# Patient Record
Sex: MALE | Race: WHITE | URBAN - METROPOLITAN AREA
[De-identification: names, ages, dates, MRNs, and addresses within clinical notes are randomized per-mention and may not be internally consistent; named-entity substitution may affect disease eponyms.]

---

## 2017-12-22 ENCOUNTER — OFFICE VISIT (OUTPATIENT)
Dept: URGENT CARE | Facility: CLINIC | Age: 52
End: 2017-12-22
Payer: COMMERCIAL

## 2017-12-22 PROCEDURE — G0382 LEV 3 HOSP TYPE B ED VISIT: HCPCS

## 2017-12-22 PROCEDURE — 99283 EMERGENCY DEPT VISIT LOW MDM: CPT

## 2017-12-23 NOTE — PROGRESS NOTES
Assessment   1  Acute tonsillitis (463) (J03 90)    Plan   Acute tonsillitis    · Amoxicillin 500 MG Oral Capsule; TAKE 1 CAPSULE 3 TIMES DAILY UNTIL GONE    Chief Complaint   1  Sore Throat  Chief Complaint Free Text Note Form: Pt c/o sudden onset sore throat and diffiuclty swallowing since last night      History of Present Illness   HPI: This patient developed a sore throat last evening  No aches or chills by history  No cough or coryza  Patient is alert oriented pleasant and in no distress  He does not appear toxic  Pupils equal react to light sclerae white conjunctiva pink  Nose is clear  Throat shows symmetrically enlarged tonsils which are inflamed  No exudate  No peritonsillar abscess  TMs are normal  Neck is supple with tender anterior cervical nodes  Lungs are clear bilaterally with good breath sounds  No wheezing rales or rhonchi  Heart is regular  Good skin color and turgor  No skin rash  Hospital Based Practices Required Assessment:      Pain Assessment      the patient states they have pain  (on a scale of 0 to 10, the patient rates the pain at 9 )      Abuse And Domestic Violence Screen       Yes, the patient is safe at home  Depression And Suicide Screen  No, the patient has not had thoughts of hurting themself  No, the patient has not felt depressed in the past 7 days  Prefered Language is  [place]  Primary Language is  English  Active Problems   1  Acute laryngotracheitis (464 20) (J04 2)   2  Mild intermittent reactive airway disease with acute exacerbation (493 92) (J45 21)    Past Medical History   1  History of Abscess (682 9) (L02 91)   2  Acute viral pharyngitis (462) (J02 8,B97 89)   3  History of Cough (786 2) (R05)   4  History of squamous cell carcinoma of skin (V10 83) (Z85 828)   5  History of Keratotic lesion (701 1) (L57 0)   6  History of Localized adiposity (278 1) (E65)   7  History of Prepatellar bursitis, unspecified laterality (726 65) (M70 40)   8  History of Right patellofemoral syndrome (719 46) (M22 2X1)   9  History of Skin growth (239 2) (D49 2)   10  History of Synovial cyst of popliteal space (727 51) (M71 20)  Active Problems And Past Medical History Reviewed: The active problems and past medical history were reviewed and updated today  Family History   Mother    1  Family history unknown (V49 89) (Z78 9)  Father    2  Family history unknown (V49 89) (Z78 9)  Family History Reviewed: The family history was reviewed and updated today  Social History    · Marital History - Currently    · Never A Smoker   · Working Full Time  Social History Reviewed: The social history was reviewed and updated today  Surgical History   1  History of Appendectomy  Surgical History Reviewed: The surgical history was reviewed and updated today  Current Meds    1  No Reported Medications Recorded  Medication List Reviewed: The medication list was reviewed and updated today  Allergies   1   No Known Drug Allergies    Vitals   Signs   Recorded: 48Gcc8134 04:25PM   Temperature: 100 F  Heart Rate: 80  Respiration: 16  Systolic: 487  Diastolic: 80  Height: 5 ft 11 in  Weight: 215 lb   BMI Calculated: 29 99  BSA Calculated: 2 17  O2 Saturation: 99  Pain Scale: 9    Signatures    Electronically signed by : SHARONDA Crockett ; Dec 22 2017  4:34PM EST                       (Author)

## 2018-01-04 ENCOUNTER — OFFICE VISIT (OUTPATIENT)
Dept: URGENT CARE | Facility: CLINIC | Age: 53
End: 2018-01-04
Payer: COMMERCIAL

## 2018-01-04 LAB — S PYO AG THROAT QL: NEGATIVE

## 2018-01-04 PROCEDURE — 99283 EMERGENCY DEPT VISIT LOW MDM: CPT

## 2018-01-04 PROCEDURE — 87430 STREP A AG IA: CPT

## 2018-01-04 PROCEDURE — G0382 LEV 3 HOSP TYPE B ED VISIT: HCPCS

## 2018-01-05 NOTE — PROGRESS NOTES
Assessment   1  Postnasal drip (963 39) (R09 82)    Plan   Acute tonsillitis    · Rapid StrepA- POC; Source:Throat; Status:Resulted - Requires Verification,Retrospective    By Protocol Authorization;   Done: 05RJE4547 03:10PM  Postnasal drip    · Fluticasone Propionate 50 MCG/ACT Nasal Suspension; 1 spray in ea nostril bid    Discussion/Summary   Discussion Summary:    The rapid strep was negative  I suspect that your symptoms are due to postnasal drip irritating the throat  Increase fluids, and use the nasal spray as written  Zyrtec for the congestion  Avoid decongestants due to elevated blood pressure  Follow up with FMD regarding the elevated BP  Chief Complaint   1  Cold Symptoms  Chief Complaint Free Text Note Form: Pt was seen here on 12/22 and given amoxicillin for tonsillitis  He completed the abx  Last inght he developed a scratchy throat, cough and right ear pain  History of Present Illness   HPI: He was treated with Amoxicillin 2 wks ago  States that he got better  Now c/o scratchy throat and inc congestion mostly in the Rt ear  is elevated  He believes it is white coat  Hospital Based Practices Required Assessment:      Pain Assessment      the patient states they do not have pain  Abuse And Domestic Violence Screen       Yes, the patient is safe at home  -- The patient states no one is hurting them  Depression And Suicide Screen  No, the patient has not had thoughts of hurting themself  No, the patient has not felt depressed in the past 7 days  Prefered Language is  [place]  Primary Language is  English  Review of Systems   Focused-Male:      Constitutional: no fever or chills, feels well, no tiredness, no recent weight loss or weight gain  ENT: as noted in HPI  Respiratory: no complaints of shortness of breath, no wheezing or cough, no dyspnea on exertion, no orthopnea or PND        Musculoskeletal: no complaints of arthralgia, no myalgia, no joint swelling or stiffness, no limb pain or swelling  Integumentary: no complaints of skin rash or lesion, no itching or dry skin, no skin wounds  Active Problems   1  Acute laryngotracheitis (464 20) (J04 2)   2  Acute tonsillitis (463) (J03 90)   3  Mild intermittent reactive airway disease with acute exacerbation (493 92) (J45 21)    Past Medical History   1  History of Abscess (682 9) (L02 91)   2  Acute viral pharyngitis (462) (J02 8,B97 89)   3  History of Cough (786 2) (R05)   4  History of squamous cell carcinoma of skin (V10 83) (Z85 828)   5  History of Keratotic lesion (701 1) (L57 0)   6  History of Localized adiposity (278 1) (E65)   7  History of Prepatellar bursitis, unspecified laterality (726 65) (M70 40)   8  History of Right patellofemoral syndrome (719 46) (M22 2X1)   9  History of Skin growth (239 2) (D49 2)   10  History of Synovial cyst of popliteal space (727 51) (M71 20)    Family History   Mother    1  Family history unknown (V49 89) (Z78 9)  Father    2  Family history unknown (V49 89) (Z78 9)    Social History    · Marital History - Currently    · Never A Smoker   · Working Full Time    Surgical History   1  History of Appendectomy    Current Meds    1  No Reported Medications Recorded    Allergies   1  No Known Drug Allergies    Vitals   Signs   Recorded: 68WJJ2846 02:48PM   Temperature: 98 2 F  Heart Rate: 114  Respiration: 16  Systolic: 100  Diastolic: 441  O2 Saturation: 95    Physical Exam        Constitutional      General appearance: No acute distress, well appearing and well nourished  Eyes      Conjunctiva and lids: No swelling, erythema, or discharge  Ears, Nose, Mouth, and Throat      External inspection of ears and nose: Normal        Otoscopic examination: Abnormal  -- There is inc congestion behind the Rt TM  -- Pharynx is ess OK  Pulmonary      Respiratory effort: No increased work of breathing or signs of respiratory distress  Auscultation of lungs: Clear to auscultation  Cardiovascular      Auscultation of heart: Normal rate and rhythm, normal S1 and S2, without murmurs  Musculoskeletal      Gait and station: Normal        Digits and nails: Normal without clubbing or cyanosis  Inspection/palpation of joints, bones, and muscles: Normal        Skin      Skin and subcutaneous tissue: Abnormal  -- Inc tattoos        Results/Data   Rapid Marsa Drown- POC 22SLE0249 03:10PM Miguel Angel Anne      Test Name Result Flag Reference   Rapid Strep Negative                      Signatures    Electronically signed by : Oneil Chávez MD; Jan 4 2018  3:31PM EST                       (Author)

## 2018-01-08 ENCOUNTER — OFFICE VISIT (OUTPATIENT)
Dept: URGENT CARE | Facility: CLINIC | Age: 53
End: 2018-01-08
Payer: COMMERCIAL

## 2018-01-08 PROCEDURE — G0382 LEV 3 HOSP TYPE B ED VISIT: HCPCS

## 2018-01-08 PROCEDURE — 99283 EMERGENCY DEPT VISIT LOW MDM: CPT

## 2018-01-09 NOTE — PROGRESS NOTES
Assessment   1  Acute pharyngitis (462) (J02 9)    Plan   Acute pharyngitis    · Azithromycin 250 MG Oral Tablet; TAKE 2 TABLETS ON DAY 1 THEN TAKE 1    TABLET A DAY FOR 4 DAYS   · PredniSONE 20 MG Oral Tablet; take one tab daily for 3 days    Chief Complaint   1  Sore Throat  Chief Complaint Free Text Note Form: The patient has been here 12/22 and 1/4 for a sore throat  He reports after the first visit he improved for four days and then developed a sore throat again  He denies any fevers  History of Present Illness   HPI: December 22nd this patient was treated for tonsillitis with amoxicillin to resolution  Few days ago he began to have a sore throat again  He described nasal congestion but this seems to have subsided  No coughing  No fever or chills  His chief complaint is pain when swallowing  is alert oriented pleasant and in no distress  He does not appear toxic  Pupils equal react to light sclerae white conjunctiva pink  Nose is mildly congested  Throat shows moderately enlarged tonsils with some inflammation  No exudate  Neck is supple with anterior cervical nodes  TMs are normal      apparently was seen here 4 days ago and had a negative strep screen  He specifically requested prednisone for his symptoms  Hospital Based Practices Required Assessment:      Pain Assessment      the patient states they have pain  The pain is located in the sore throat  (on a scale of 0 to 10, the patient rates the pain at 7 )      Abuse And Domestic Violence Screen       Yes, the patient is safe at home  -- The patient states no one is hurting them  Depression And Suicide Screen  No, the patient has not had thoughts of hurting themself  No, the patient has not felt depressed in the past 7 days  Active Problems   1  Acute laryngotracheitis (464 20) (J04 2)   2  Acute tonsillitis (463) (J03 90)   3  Mild intermittent reactive airway disease with acute exacerbation (493 92) (J45 21)   4   Postnasal drip (784 91) (R09 82)    Past Medical History   1  History of Abscess (682 9) (L02 91)   2  Acute viral pharyngitis (462) (J02 8,B97 89)   3  History of Cough (786 2) (R05)   4  History of squamous cell carcinoma of skin (V10 83) (Z85 828)   5  History of Keratotic lesion (701 1) (L57 0)   6  History of Localized adiposity (278 1) (E65)   7  History of Prepatellar bursitis, unspecified laterality (726 65) (M70 40)   8  History of Right patellofemoral syndrome (719 46) (M22 2X1)   9  History of Skin growth (239 2) (D49 2)   10  History of Synovial cyst of popliteal space (727 51) (M71 20)  Active Problems And Past Medical History Reviewed: The active problems and past medical history were reviewed and updated today  Family History   Mother    1  Family history unknown (V49 89) (Z78 9)  Father    2  Family history unknown (V49 89) (Z78 9)  Family History Reviewed: The family history was reviewed and updated today  Social History    · Marital History - Currently    · Never A Smoker   · Working Full Time  Social History Reviewed: The social history was reviewed and updated today  Surgical History   1  History of Appendectomy  Surgical History Reviewed: The surgical history was reviewed and updated today  Current Meds    1  Fluticasone Propionate 50 MCG/ACT Nasal Suspension; 1 spray in ea nostril bid; Therapy: 35FAY6024 to (Last FX:56WKR3439)  Requested for: 90UVP9486 Ordered  Medication List Reviewed: The medication list was reviewed and updated today  Allergies   1   No Known Drug Allergies    Vitals   Signs   Recorded: 15RKB0847 04:21PM   Temperature: 99 2 F  Heart Rate: 118  Respiration: 16  Systolic: 247  Diastolic: 98  Height: 5 ft 11 in  Weight: 215 lb   BMI Calculated: 29 99  BSA Calculated: 2 17  O2 Saturation: 96  Pain Scale: 7    Signatures    Electronically signed by : SHARONDA Doe ; Jan 8 2018  4:35PM EST                       (Author)

## 2018-01-13 NOTE — PROGRESS NOTES
Assessment    1  Encounter for preventive health examination (V70 0) (Z00 00)    Plan  Encounter for screening colonoscopy    · COLONOSCOPY; Status:Active; Requested RVB:16TJQ1905;    · 2 - Shania Gardner MD, Quique Stoner  (Gastroenterology) Physician Referral  Consult  Status: Active   Requested for: 63YWK1949  Care Summary provided  : Yes  Health Maintenance    · Call [phone] if: You have any warning signs of skin cancer ; Status:Complete;    Done: 86JCV3369   · Call 911 if: You experience a new kind of chest pain (angina) or pressure ;  Status:Complete;   Done: 86JIW7968   · Always use a seat belt and shoulder strap when riding or driving a motor vehicle ;  Status:Complete;   Done: 40VKB1939   · Begin a limited exercise program ; Status:Complete;   Done: 82PGO4797   · Brush your teeth freq1 and floss at least once a day ; Status:Complete;   Done:  60IIE0071   · Keep a diary of when and what you eat ; Status:Complete;   Done: 22FJA4212   · Regular aerobic exercise can help reduce stress ; Status:Complete;   Done: 11EJM2475   · Some eating tips that can help you lose weight ; Status:Complete;   Done: 93KOV3732   · Use a sun block product with an SPF of 15 or more ; Status:Complete;   Done:  99GIP0011   · We encourage all of our patients to exercise regularly    30 minutes of exercise or physical  activity five or more days a week is recommended for children and adults ;  Status:Complete;   Done: 88NYE8386   · We recommend routine visits to a dentist ; Status:Complete;   Done: 82YIS0969   · We recommend that you bring your body mass index down to 26 ; Status:Complete;    Done: 72TUB7717   · We recommend that you follow the "Mediterranean diet "; Status:Complete;   Done:  02JJW3184   · We recommend that you follow these rules for gun safety ; Status:Complete;   Done:  82SUI9511   · Follow-up visit in 1 year Evaluation and Treatment  Follow-up  Status: Complete  Done:  16ARS4443  Need for diphtheria-tetanus-pertussis (Tdap) vaccine    · Adacel 5-2-15 5 LF-MCG/0 5 Intramuscular Suspension  PMH: Flu vaccine need    · Stop: Fluzone Intramuscular Injectable    Discussion/Summary  Impression: health maintenance visit  Currently, he eats a poor diet and has an inadequate exercise regimen  Prostate cancer screening: the risks and benefits of prostate cancer screening were discussed and prostate cancer screening is current  Testicular cancer screening: the risks and benefits of testicular cancer screening were discussed and self testicular exam technique was taught  Colorectal cancer screening: the risks and benefits of colorectal cancer screening were discussed and colonoscopy has been ordered  Screening lab work includes glucose, lipid profile and urinalysis  The risks and benefits of immunizations were discussed, immunizations are needed and shot declined will ggive Adacel today  He was advised to be evaluated by Gastroenterologist for colonoscopy  Advice and education were given regarding nutrition, aerobic exercise, weight bearing exercise and weight loss  Patient discussion: discussed with the patient  50-year-old man here today for comprehensive physical examination  Aside from being somewhat overweight his examination is normal  Issues at hand are elevated cholesterol levels he needs to modify his diet and lose some weight  He does not have an aerobic exercise routine and I have recommended this to him  Will give him paperwork for colonoscopy a referral  I'm giving him Adacel today  Will reappoint in one year  Chief Complaint  Complete Physical 46 y/o  Does not want the flu shot  Has not had a colonoscopy done yet  Review of Systems    Constitutional: No fever or chills, feels well, no tiredness, no recent weight gain or weight loss  Eyes: No complaints of eye pain, no red eyes, no discharge from eyes, no itchy eyes     ENT: no complaints of earache, no hearing loss, no nosebleeds, no nasal discharge, no sore throat, no hoarseness  Cardiovascular: No complaints of slow heart rate, no fast heart rate, no chest pain, no palpitations, no leg claudication, no lower extremity  Respiratory: No complaints of shortness of breath, no wheezing, no cough, no SOB on exertion, no orthopnea or PND  Gastrointestinal: No complaints of abdominal pain, no constipation, no nausea or vomiting, no diarrhea or bloody stools  Genitourinary: No complaints of dysuria, no incontinence, no hesitancy, no nocturia, no genital lesion, no testicular pain  Musculoskeletal: No complaints of arthralgia, no myalgias, no joint swelling or stiffness, no limb pain or swelling  Integumentary: No complaints of skin rash or skin lesions, no itching, no skin wound, no dry skin  Neurological: No compliants of headache, no confusion, no convulsions, no numbness or tingling, no dizziness or fainting, no limb weakness, no difficulty walking  Psychiatric: Is not suicidal, no sleep disturbances, no anxiety or depression, no change in personality, no emotional problems  Endocrine: No complaints of proptosis, no hot flashes, no muscle weakness, no erectile dysfunction, no deepening of the voice, no feelings of weakness  Hematologic/Lymphatic: No complaints of swollen glands, no swollen glands in the neck, does not bleed easily, no easy bruising        Past Medical History    · History of Abscess (682 9) (L02 91)   · History of Blood tests for routine general physical examination (V72 62) (Z00 00)   · History of Cough (786 2) (R05)   · History of Flu vaccine need (V04 81) (Z23)   · History of squamous cell carcinoma of skin (V10 83) (Z85 828)   · History of Keratotic lesion (701 1) (L57 0)   · History of Localized adiposity (278 1) (E65)   · History of Prepatellar bursitis, unspecified laterality (726 65) (M70 40)   · History of Right patellofemoral syndrome (719 46) (M22 2X1)   · History of Skin growth (239 2) (D49 2)   · History of Synovial cyst of popliteal space (727 51) (M71 20)    Surgical History    · History of Appendectomy    Family History    · Family history unknown (V49 89) (Z78 9)    · Family history unknown (V49 89) (Z78 9)    Social History    · Marital History - Currently    · Never A Smoker   · Working Full Time    Current Meds   1  No Reported Medications Recorded    Allergies    1  No Known Drug Allergies    Vitals   Recorded: 80XJM7627 10:08AM Recorded: 55EHM0405 09:56AM   Heart Rate  66   Systolic 665 118, LUE, Sitting   Diastolic 80 679, LUE, Sitting   Height  6 ft    Weight  218 lb    BMI Calculated  29 57   BSA Calculated  2 21     Physical Exam    Constitutional   General appearance: No acute distress, well appearing and well nourished  Head and Face   Head and face: Normal     Palpation of the face and sinuses: No sinus tenderness  Eyes   Conjunctiva and lids: No erythema, swelling or discharge  Pupils and irises: Equal, round, reactive to light  Ophthalmoscopic examination: Normal fundi and optic discs  Ears, Nose, Mouth, and Throat   External inspection of ears and nose: Normal     Otoscopic examination: Tympanic membranes translucent with normal light reflex  Canals patent without erythema  Hearing: Normal     Nasal mucosa, septum, and turbinates: Normal without edema or erythema  Lips, teeth, and gums: Normal, good dentition  Oropharynx: Normal with no erythema, edema, exudate or lesions  Neck   Neck: Supple, symmetric, trachea midline, no masses  Thyroid: Normal, no thyromegaly  Pulmonary   Respiratory effort: No increased work of breathing or signs of respiratory distress  Percussion of chest: Normal     Palpation of chest: Normal     Auscultation of lungs: Clear to auscultation  Cardiovascular   Palpation of heart: Normal PMI, no thrills  Auscultation of heart: Normal rate and rhythm, normal S1 and S2, no murmurs  Carotid pulses: 2+ bilaterally      Abdominal aorta: Normal     Femoral pulses: 2+ bilaterally  Pedal pulses: 2+ bilaterally  Examination of extremities for edema and/or varicosities: Normal     Chest   Breasts: Normal, no dimpling or skin changes appreciated  Palpation of breasts and axillae: Normal, no masses palpated  Chest: Normal     Abdomen   Abdomen: Non-tender, no masses  Liver and spleen: No hepatomegaly or splenomegaly  Examination for hernias: No hernias appreciated  Anus, perineum, and rectum: Normal sphincter tone, no masses, no prolapse  Stool sample for occult blood: Negative  Genitourinary   Scrotal contents: Normal testes, no masses  Penis: Normal, no lesions  Digital rectal exam of prostate: Normal size, no masses  Lymphatic   Palpation of lymph nodes in neck: No lymphadenopathy  Palpation of lymph nodes in axillae: No lymphadenopathy  Palpation of lymph nodes in groin: No lymphadenopathy  Palpation of lymph nodes in other areas: No lymphadenopathy  Musculoskeletal   Gait and station: Normal     Inspection/palpation of digits and nails: Normal without clubbing or cyanosis  Inspection/palpation of joints, bones, and muscles: Normal     Range of motion: Normal     Stability: Normal     Muscle strength/tone: Normal     Skin   Skin and subcutaneous tissue: Normal without rashes or lesions  Palpation of skin and subcutaneous tissue: Normal turgor  Neurologic   Cranial nerves: Cranial nerves 2-12 intact  Reflexes: 2+ and symmetric  Sensation: No sensory loss  Coordination: Normal finger to nose and heel to shin  Psychiatric   Judgment and insight: Normal     Orientation to person, place and time: Normal     Recent and remote memory: Intact  Mood and affect: Normal        Procedure    Procedure: Visual Acuity Test    Indication: routine screening  Inforrmation supplied by a Snellen chart     Results: 20/25 in the right eye with corrective device, 20/25 in the left eye with corrective device Signatures   Electronically signed by : SHARONDA Bang ; Jan 29 2016 10:40AM EST                       (Author)

## 2018-01-16 NOTE — MISCELLANEOUS
Message   Recorded as Task   Date: 11/28/2016 12:14 PM, Created By: Guillermina Jennings   Task Name: Medical Complaint Callback   Assigned To: Yasmin Montenegro   Regarding Patient: Adolfo Penaloza, Status: Active   Comment:    Venessa Rockwell - 28 Nov 2016 12:14 PM     TASK CREATED  Caller: Self; Medical Complaint; [phone] (Home); [phone] (Work)  BEENA IS STILL COUGHING AND YOU WANTED HIM TO LET YOU KNOW AND YOU WOULD CALL SOMETHING ELSE IN FOR HIM      PROF    [address] [place] [address] - 28 Nov 2016 3:53 PM     TASK EDITED  Tell him I called and another antibiotic for him   Yasmin Montenegro - 28 Nov 2016 5:10 PM     TASK EDITED  Patient advised  PLM        Active Problems    1  Acute laryngotracheitis (464 20) (J04 2)   2  Mild intermittent reactive airway disease with acute exacerbation (493 92) (J45 21)    Current Meds   1  PredniSONE 10 MG Oral Tablet; Day 1  50 mg after breakfast   Day 2  40 mg after breakfast   Day 3  30 mg after breakfast   Day 4  20 mg after breakfast   Day 5  10 mg;   Therapy: 76GWN9879 to (Complete:30Nov2016)  Requested for: 58BXL8785; Last   Rx:25Nov2016 Ordered    Allergies    1  No Known Drug Allergies    Plan  Acute laryngotracheitis    · DrRx Zithromax Z-Stefano 250 MG #6 pill pack  PMH: Acute bronchitis    · Cefuroxime Axetil 500 MG Oral Tablet;  Take 1 tablet twice daily    Signatures   Electronically signed by : Concha Pope, ; Nov 28 2016  5:10PM EST                       (Author)

## 2018-01-23 VITALS
TEMPERATURE: 99.2 F | BODY MASS INDEX: 30.1 KG/M2 | WEIGHT: 215 LBS | HEART RATE: 118 BPM | RESPIRATION RATE: 16 BRPM | OXYGEN SATURATION: 96 % | HEIGHT: 71 IN | DIASTOLIC BLOOD PRESSURE: 98 MMHG | SYSTOLIC BLOOD PRESSURE: 168 MMHG

## 2018-01-23 VITALS
RESPIRATION RATE: 16 BRPM | DIASTOLIC BLOOD PRESSURE: 100 MMHG | TEMPERATURE: 98.2 F | SYSTOLIC BLOOD PRESSURE: 162 MMHG | OXYGEN SATURATION: 95 % | HEART RATE: 114 BPM

## 2018-01-23 VITALS
BODY MASS INDEX: 30.1 KG/M2 | WEIGHT: 215 LBS | HEART RATE: 80 BPM | OXYGEN SATURATION: 99 % | SYSTOLIC BLOOD PRESSURE: 124 MMHG | DIASTOLIC BLOOD PRESSURE: 80 MMHG | RESPIRATION RATE: 16 BRPM | HEIGHT: 71 IN | TEMPERATURE: 100 F

## 2018-03-12 ENCOUNTER — OFFICE VISIT (OUTPATIENT)
Dept: URGENT CARE | Facility: CLINIC | Age: 53
End: 2018-03-12
Payer: COMMERCIAL

## 2018-03-12 VITALS
WEIGHT: 228 LBS | BODY MASS INDEX: 30.88 KG/M2 | HEART RATE: 84 BPM | OXYGEN SATURATION: 96 % | HEIGHT: 72 IN | TEMPERATURE: 98.5 F | SYSTOLIC BLOOD PRESSURE: 152 MMHG | DIASTOLIC BLOOD PRESSURE: 88 MMHG | RESPIRATION RATE: 16 BRPM

## 2018-03-12 DIAGNOSIS — M71.21 BAKER'S CYST OF KNEE, RIGHT: Primary | ICD-10-CM

## 2018-03-12 PROCEDURE — G0382 LEV 3 HOSP TYPE B ED VISIT: HCPCS | Performed by: EMERGENCY MEDICINE

## 2018-03-12 RX ORDER — DIPHENOXYLATE HYDROCHLORIDE AND ATROPINE SULFATE 2.5; .025 MG/1; MG/1
1 TABLET ORAL DAILY
COMMUNITY

## 2018-03-12 NOTE — PATIENT INSTRUCTIONS
Ice as needed, activity as tolerated, Ibuprofen 600 mg 4 x a day if necessary, call [phone] for foolow-up

## 2018-03-12 NOTE — PROGRESS NOTES
Assessment/Plan:    No problem-specific Assessment & Plan notes found for this encounter  Diagnoses and all orders for this visit:    Baker's cyst of knee, right    Other orders  -     multivitamin (THERAGRAN) TABS; Take 1 tablet by mouth daily          Subjective:      Patient ID: Elmira Ch is a 47 y o  male  History of Baker's Cyst R knee; c/o pain in popliteal fossa on and off for 1 year, getting more frequent now  Has not taken any medications for this      Knee Pain    The incident occurred more than 1 week ago  Incident location: no mechanism  The injury mechanism is unknown  The pain is present in the right knee  The quality of the pain is described as aching  The pain is at a severity of 3/10  The pain is mild  The pain has been fluctuating since onset  He reports no foreign bodies present  The symptoms are aggravated by movement  He has tried ice for the symptoms  The treatment provided mild relief  The following portions of the patient's history were reviewed and updated as appropriate: current medications, past family history, past medical history, past social history, past surgical history and problem list     Review of Systems   Musculoskeletal: Positive for arthralgias (R knee)  All other systems reviewed and are negative  Objective:      /88   Pulse 84   Temp 98 5 °F (36 9 °C)   Resp 16   Ht 6' (1 829 m)   Wt 103 kg (228 lb)   SpO2 96%   BMI 30 92 kg/m²          Physical Exam   Constitutional: He is oriented to person, place, and time  He appears well-developed and well-nourished  HENT:   Nose: Nose normal    Eyes: Pupils are equal, round, and reactive to light  Neck: Normal range of motion  Cardiovascular: Normal rate  Pulmonary/Chest: Effort normal    Abdominal: Soft  Musculoskeletal:   Tender, fullness in popliteal fossa; all ligaments intact   Neurological: He is alert and oriented to person, place, and time  Skin: Skin is warm and dry  Psychiatric: He has a normal mood and affect  His behavior is normal  Judgment and thought content normal    Nursing note and vitals reviewed

## 2018-08-03 ENCOUNTER — APPOINTMENT (OUTPATIENT)
Dept: RADIOLOGY | Facility: CLINIC | Age: 53
End: 2018-08-03
Payer: COMMERCIAL

## 2018-08-03 VITALS
DIASTOLIC BLOOD PRESSURE: 146 MMHG | BODY MASS INDEX: 30.34 KG/M2 | SYSTOLIC BLOOD PRESSURE: 195 MMHG | HEART RATE: 91 BPM | HEIGHT: 72 IN | WEIGHT: 224 LBS

## 2018-08-03 DIAGNOSIS — M25.561 ACUTE PAIN OF RIGHT KNEE: ICD-10-CM

## 2018-08-03 DIAGNOSIS — M17.11 OSTEOARTHROSIS, LOCALIZED, PRIMARY, KNEE, RIGHT: Primary | ICD-10-CM

## 2018-08-03 PROCEDURE — 99203 OFFICE O/P NEW LOW 30 MIN: CPT | Performed by: ORTHOPAEDIC SURGERY

## 2018-08-03 PROCEDURE — 73564 X-RAY EXAM KNEE 4 OR MORE: CPT

## 2018-08-03 NOTE — ASSESSMENT & PLAN NOTE
74-year-old male with a right knee osteoarthritis  I reviewed the radiographs with him and discussed the findings  We discussed the importance of low-impact exercise, icing, and anti-inflammatory use  I recommended physical therapy for him and given him a script for this  We have also discussed different injections  I think he would benefit from viscosupplementatio  I will see him back once we have approval for this

## 2018-08-03 NOTE — PROGRESS NOTES
Assessment:     1  Osteoarthrosis, localized, primary, knee, right          Plan:    Problem List Items Addressed This Visit        Musculoskeletal and Integument    Osteoarthrosis, localized, primary, knee, right - Primary     59-year-old male with a right knee osteoarthritis  I reviewed the radiographs with him and discussed the findings  We discussed the importance of low-impact exercise, icing, and anti-inflammatory use  I recommended physical therapy for him and given him a script for this  We have also discussed different injections  I think he would benefit from viscosupplementatio  I will see him back once we have approval for this  Relevant Orders    XR knee 4+ vw right injury    Ambulatory referral to Physical Therapy    Injection procedure prior authorization           Patient ID: Nestor Garza is a 47 y o  male  Chief Complaint:  Right knee pain    HPI:  Patient is here for evaluation for right knee pan  Patient states he has been having knee pain for years but has been getting worse the last year  Patient is a   Patient states he is discomfort in the right knee cap area  Denies any numbness or tingling  Patient pain is worse when bending, kneeling, squatting or running  His pain is better with wearing a knee sleeve, ice and taking Advil for pain  There been no specific injuries  He denies any locking or giving way  Patient's medical intake was reviewed  Allergy:  No Known Allergies    Medications:  all current active meds have been reviewed    Past Medical History:  History reviewed  No pertinent past medical history  Past Surgical History:  History reviewed  No pertinent surgical history  Family History:  History reviewed  No pertinent family history      Social History:  History   Alcohol use Not on file     History   Drug use: Unknown     History   Smoking Status    Not on file   Smokeless Tobacco    Not on file           ROS:  Review of Systems Constitutional: Negative  HENT: Negative  Eyes: Negative  Respiratory: Negative  Cardiovascular: Negative  Gastrointestinal: Negative  Endocrine: Negative  Musculoskeletal: Positive for arthralgias  Neurological: Negative  Psychiatric/Behavioral: Negative  Objective:  BP Readings from Last 1 Encounters:   08/03/18 (!) 195/146      Wt Readings from Last 1 Encounters:   08/03/18 102 kg (224 lb)        BMI:   Estimated body mass index is 30 38 kg/m² as calculated from the following:    Height as of this encounter: 6' (1 829 m)  Weight as of this encounter: 102 kg (224 lb)  EXAM:   Physical Exam   Constitutional: He is oriented to person, place, and time  He appears well-developed and well-nourished  No distress  HENT:   Head: Normocephalic and atraumatic  Right Ear: External ear normal    Left Ear: External ear normal    Eyes: Pupils are equal, round, and reactive to light  Right eye exhibits no discharge  Left eye exhibits no discharge  Neck: Normal range of motion  Neck supple  No tracheal deviation present  Cardiovascular: Normal rate and regular rhythm  Pulmonary/Chest: Effort normal  No respiratory distress  Abdominal: Soft  He exhibits no distension  There is no tenderness  Musculoskeletal:        Right knee: He exhibits no effusion  Left knee: He exhibits no effusion  Neurological: He is alert and oriented to person, place, and time  Skin: Skin is warm and dry  He is not diaphoretic  No erythema  Psychiatric: He has a normal mood and affect   His behavior is normal  Thought content normal      Right Knee Exam     Other   Erythema: absent  Sensation: normal  Pulse: present  Other tests: no effusion present    Comments:  Positive grind test  Crepitus with ROM  MJL/ LJL/Pes Bursa/ AJL: no tenderness  Patella grind test: Positive  Mild quad atrophy  Varus/Valgus: Negative  Lochman's test: Negative  Rosemary's Test: Negative  Aply's test: Negative  Full range of motion  No knee effusion        Left Knee Exam   Left knee exam is normal     Tenderness   The patient is experiencing no tenderness  Range of Motion   The patient has normal left knee ROM  Tests   Rosemary:  Medial - negative Lateral - negative  Lachman:  Anterior - negative      Drawer:       Posterior - negative  Varus: negative  Valgus: negative  Pivot Shift: negative  Patellar Apprehension: negative    Other   Erythema: absent  Sensation: normal  Pulse: present  Swelling: none  Effusion: no effusion present              Radiographs:  I have personally reviewed pertinent films in PACS     Right knee xrays:  Mild to moderate osteoarthritis tricompartmentally, most significant of the patellofemoral joint    Scribe Attestation    I,:   Jenise Mcdonough am acting as a scribe while in the presence of the attending physician :        I,:   Nereyda Sanchez MD personally performed the services described in this documentation    as scribed in my presence :

## 2018-08-04 ENCOUNTER — OFFICE VISIT (OUTPATIENT)
Dept: FAMILY MEDICINE CLINIC | Facility: CLINIC | Age: 53
End: 2018-08-04
Payer: COMMERCIAL

## 2018-08-04 VITALS
HEART RATE: 56 BPM | SYSTOLIC BLOOD PRESSURE: 170 MMHG | HEIGHT: 72 IN | WEIGHT: 223 LBS | DIASTOLIC BLOOD PRESSURE: 102 MMHG | BODY MASS INDEX: 30.2 KG/M2

## 2018-08-04 DIAGNOSIS — L72.0 EPIDERMAL CYST OF NECK: Primary | ICD-10-CM

## 2018-08-04 PROBLEM — J03.90 ACUTE TONSILLITIS: Status: ACTIVE | Noted: 2017-12-22

## 2018-08-04 PROCEDURE — 99213 OFFICE O/P EST LOW 20 MIN: CPT | Performed by: NURSE PRACTITIONER

## 2018-08-05 NOTE — PROGRESS NOTES
Assessment/Plan   Diagnoses and all orders for this visit:    Epidermal cyst of neck  -     Ambulatory referral to General Surgery; Future        Chief Complaint   Patient presents with    Mass     x 1 week -- has gotten slightly bigger since found       Subjective   Patient ID: Raul Albrecht is a 47 y o  male  Vitals:    08/04/18 0821   BP: (!) 170/102   Pulse: 64      Carmine Amezquita is here today for referral to surgery for a lump that he found at the base of his neck to the left side that he found 4 days ago  States has a history of cyst that need to be removed  It is not painful, he has no fever or any recent illness  The following portions of the patient's history were reviewed and updated as appropriate: allergies, current medications, past family history, past medical history, past surgical history and problem list     Review of Systems   Constitutional: Negative  HENT: Negative  Eyes: Negative  Respiratory: Negative  Cardiovascular: Negative  Gastrointestinal: Negative  Endocrine: Negative  Genitourinary: Negative  Musculoskeletal: Negative  Skin: Negative  Allergic/Immunologic: Negative  Neurological: Negative  Hematological: Negative  Psychiatric/Behavioral: Negative  Objective     Physical Exam   Constitutional: He is oriented to person, place, and time  He appears well-developed and well-nourished  No distress  HENT:   Head: Normocephalic and atraumatic  Right Ear: External ear normal    Left Ear: External ear normal    Nose: Nose normal    Mouth/Throat: Oropharyngeal exudate present  Eyes: Conjunctivae are normal    Neck: Normal range of motion  Neck supple  Cardiovascular: Normal rate, regular rhythm, normal heart sounds and intact distal pulses  No murmur heard  Repeat blood pressure 160/90, heart rate 68   Pulmonary/Chest: Effort normal and breath sounds normal    Abdominal: Soft  Musculoskeletal: Normal range of motion   He exhibits no edema or tenderness  Neurological: He is alert and oriented to person, place, and time  Skin: Skin is warm and dry  Capillary refill takes less than 2 seconds  There is a firm mass that measures approximately 1 5 centimeters in diameter, without redness or warmth, or signs of infection  It is located on the left side of his neck, it is not within the posterior cervical chain of lymph nodes  Psychiatric: He has a normal mood and affect  His behavior is normal  Judgment and thought content normal    Nursing note and vitals reviewed    No Known Allergies  Patient Active Problem List   Diagnosis    Osteoarthrosis, localized, primary, knee, right    Reactive airway disease    Acute tonsillitis       Current Outpatient Prescriptions:     multivitamin (THERAGRAN) TABS, Take 1 tablet by mouth daily, Disp: , Rfl:   Social History     Social History    Marital status: /Civil Union     Spouse name: N/A    Number of children: N/A    Years of education: N/A     Occupational History          working full time     Social History Main Topics    Smoking status: Never Smoker    Smokeless tobacco: Never Used    Alcohol use Not on file    Drug use: Unknown    Sexual activity: Not on file     Other Topics Concern    Not on file     Social History Narrative    No narrative on file     Family History   Problem Relation Age of Onset    No Known Problems Mother     No Known Problems Father

## 2018-08-13 ENCOUNTER — OFFICE VISIT (OUTPATIENT)
Dept: SURGERY | Facility: HOSPITAL | Age: 53
End: 2018-08-13
Payer: COMMERCIAL

## 2018-08-13 VITALS
RESPIRATION RATE: 16 BRPM | HEIGHT: 72 IN | WEIGHT: 227 LBS | DIASTOLIC BLOOD PRESSURE: 100 MMHG | HEART RATE: 84 BPM | TEMPERATURE: 99.4 F | SYSTOLIC BLOOD PRESSURE: 180 MMHG | BODY MASS INDEX: 30.75 KG/M2

## 2018-08-13 DIAGNOSIS — E66.9 OBESITY (BMI 30-39.9): ICD-10-CM

## 2018-08-13 DIAGNOSIS — L91.8 SKIN TAGS, MULTIPLE ACQUIRED: ICD-10-CM

## 2018-08-13 DIAGNOSIS — K42.9 UMBILICAL HERNIA WITHOUT OBSTRUCTION AND WITHOUT GANGRENE: ICD-10-CM

## 2018-08-13 DIAGNOSIS — L72.0 EPIDERMAL CYST OF NECK: Primary | ICD-10-CM

## 2018-08-13 DIAGNOSIS — D22.9 MULTIPLE PIGMENTED NEVI: ICD-10-CM

## 2018-08-13 DIAGNOSIS — Z12.11 ENCOUNTER FOR SCREENING COLONOSCOPY: ICD-10-CM

## 2018-08-13 PROCEDURE — 99244 OFF/OP CNSLTJ NEW/EST MOD 40: CPT | Performed by: SURGERY

## 2018-08-13 PROCEDURE — 11420 EXC H-F-NK-SP B9+MARG 0.5/<: CPT | Performed by: SURGERY

## 2018-08-13 NOTE — LETTER
August 17, 2018     Wilmer Albrecht, [address] [place] [address]    Patient: Ad Gardiner   YOB: 1965   Date of Visit: 8/13/2018       Dear Dr Love Segura: Thank you for referring Twin Mensah to me for evaluation  Below are my notes for this consultation  If you have questions, please do not hesitate to call me  I look forward to following your patient along with you  Sincerely,        Kaz Glover MD        CC: No Recipients  Veda Joiner  8/13/2018  2:54 PM  Sign at close encounter  Assessment/Plan: Twin Mensah is a 47year old male who presents today, per referral by Dr Jolly Cordova, for a sebaceous cyst of left neck  Physical exam revealed small sebaceous cyst of left neck  Discussed risks, benefits, and alternatives to excision of sebaceous cyst in the office today  Explained the procedure and appropriate wound care  He consented and the procedure occurred without complications and was well-tolerated  He will follow up in 2 weeks  He knows to call if any questions or concerns arise  Reducible umbilical hernia- Explained etiology  Discussed risks, benefits, and alternatives to open umbilical hernia repair  He will continue monitoring and will follow up as needed  Left-sided groin weakness- He knows the symptoms to watch for  He will continue monitoring  Skin- Encouraged him to have his skin tags of neck and multiple pigmented nevi of back monitored by PCP or dermatologist   Obesity- Diet and exercise were discussed in the context of weight loss  He understands weight increases his risk of hernia formation  Colonoscopy- Explained why colonoscopies are performed  Discussed risks, benefits, and alternatives  He will schedule a colonoscopy for his earliest convenience  No problem-specific Assessment & Plan notes found for this encounter         Diagnoses and all orders for this visit:    Epidermal cyst of neck  -     Ambulatory referral to General Surgery          Subjective:      Patient ID: Tiffani Fowler is a 47 y o  male  Annette Boss is a 47year old male who presents today, per referral by Dr Chelsi Cordova, for a sebaceous cyst of left neck  Colonoscopy- He has never had a colonoscopy  No family history of colon cancer  No changes in bowel habits, no weight loss  The following portions of the patient's history were reviewed and updated as appropriate: allergies, current medications, past family history, past medical history, past social history, past surgical history and problem list     Review of Systems   Constitutional: Negative  HENT: Negative  Eyes: Negative  Respiratory: Negative  Cardiovascular: Negative  Gastrointestinal: Negative  Endocrine: Negative  Genitourinary: Negative  Musculoskeletal: Negative  Skin: Negative  Negative for color change, pallor, rash and wound  Small sebaceous cyst of left neck  Allergic/Immunologic: Negative  Neurological: Negative  Hematological: Negative  Psychiatric/Behavioral: Negative  All other systems reviewed and are negative  Objective:      BP (!) 180/100   Pulse 84   Temp 99 4 °F (37 4 °C) (Tympanic)   Resp 16   Ht 6' (1 829 m)   Wt 103 kg (227 lb)   BMI 30 79 kg/m²           Physical Exam   Constitutional: He is oriented to person, place, and time  He appears well-developed and well-nourished  No distress  HENT:   Head: Normocephalic and atraumatic  Right Ear: External ear normal    Left Ear: External ear normal    Nose: Nose normal    Mouth/Throat: Oropharynx is clear and moist  No oropharyngeal exudate  Eyes: Conjunctivae and EOM are normal  Right eye exhibits no discharge  Left eye exhibits no discharge  No scleral icterus  Neck: Normal range of motion  Neck supple  No JVD present  No tracheal deviation present  No thyromegaly present     Cardiovascular: Normal rate, regular rhythm, normal heart sounds and intact distal pulses  Exam reveals no gallop and no friction rub  No murmur heard  Pulmonary/Chest: Effort normal and breath sounds normal  No stridor  No respiratory distress  He has no wheezes  He has no rales  He exhibits no tenderness  Abdominal: Soft  Bowel sounds are normal  He exhibits no distension and no mass  There is no tenderness  There is no rebound and no guarding  Umbilical hernia  Left-sided weakness of groin  Musculoskeletal: Normal range of motion  He exhibits no edema, tenderness or deformity  Lymphadenopathy:     He has no cervical adenopathy  Neurological: He is alert and oriented to person, place, and time  No cranial nerve deficit  Coordination normal    Skin: Skin is dry  No rash noted  He is not diaphoretic  No erythema  No pallor  Sebaceous cyst of left neck  Skin tags of neck, multiple pigmented nevi of back  Psychiatric: He has a normal mood and affect  His behavior is normal  Thought content normal    Nursing note and vitals reviewed  By signing my name below, Yoko Morton, attest that this documentation has been prepared under the direction and in the presence of Lady Britt MD  Electronically Signed: Veda Gilbert  08/13/18  I, Lady De La Torre, personally performed the services described in this documentation  All medical record entries made by the madaiibchina were at my direction and in my presence  I have reviewed the chart and discharge instructions and agree that the record reflects my personal performance and is accurate and complete  Lady Britt MD  08/13/18

## 2018-08-13 NOTE — PROGRESS NOTES
Assessment/Plan: Shasta Olivo is a 47year old male who presents today, per referral by Josselyn Hernandez, for a sebaceous cyst of left neck  Physical exam revealed small sebaceous cyst of left neck  Discussed risks, benefits, and alternatives to excision of sebaceous cyst in the office today  Explained the procedure and appropriate wound care  He consented and the procedure occurred without complications and was well-tolerated  He will follow up in 2 weeks  He knows to call if any questions or concerns arise  Reducible umbilical hernia- Explained etiology  Discussed risks, benefits, and alternatives to open umbilical hernia repair  He will continue monitoring and will follow up as needed  Left-sided groin weakness- He knows the symptoms to watch for  He will continue monitoring  Skin- Encouraged him to have his skin tags of neck and multiple pigmented nevi of back monitored by PCP or dermatologist   Obesity- Diet and exercise were discussed in the context of weight loss  He understands weight increases his risk of hernia formation  Colonoscopy- Explained why colonoscopies are performed  Discussed risks, benefits, and alternatives  He will schedule a colonoscopy at his follow up in 2 weeks  No problem-specific Assessment & Plan notes found for this encounter  Diagnoses and all orders for this visit:    Epidermal cyst of neck  -     Ambulatory referral to General Surgery          Subjective:      Patient ID: Raffaele Villar is a 47 y o  male  Shasta Olivo is a 47year old male who presents today, per referral by Josselyn Hernandez, for a sebaceous cyst of left neck  Colonoscopy- He has never had a colonoscopy  No family history of colon cancer  No changes in bowel habits, no weight loss          The following portions of the patient's history were reviewed and updated as appropriate: allergies, current medications, past family history, past medical history, past social history, past surgical history and problem list     Review of Systems   Constitutional: Negative  HENT: Negative  Eyes: Negative  Respiratory: Negative  Cardiovascular: Negative  Gastrointestinal: Negative  Endocrine: Negative  Genitourinary: Negative  Musculoskeletal: Negative  Skin: Negative  Negative for color change, pallor, rash and wound  Small sebaceous cyst of left neck  Allergic/Immunologic: Negative  Neurological: Negative  Hematological: Negative  Psychiatric/Behavioral: Negative  All other systems reviewed and are negative  Objective:      BP (!) 180/100   Pulse 84   Temp 99 4 °F (37 4 °C) (Tympanic)   Resp 16   Ht 6' (1 829 m)   Wt 103 kg (227 lb)   BMI 30 79 kg/m²            Physical Exam   Constitutional: He is oriented to person, place, and time  He appears well-developed and well-nourished  No distress  HENT:   Head: Normocephalic and atraumatic  Right Ear: External ear normal    Left Ear: External ear normal    Nose: Nose normal    Mouth/Throat: Oropharynx is clear and moist  No oropharyngeal exudate  Eyes: Conjunctivae and EOM are normal  Right eye exhibits no discharge  Left eye exhibits no discharge  No scleral icterus  Neck: Normal range of motion  Neck supple  No JVD present  No tracheal deviation present  No thyromegaly present  Cardiovascular: Normal rate, regular rhythm, normal heart sounds and intact distal pulses  Exam reveals no gallop and no friction rub  No murmur heard  Pulmonary/Chest: Effort normal and breath sounds normal  No stridor  No respiratory distress  He has no wheezes  He has no rales  He exhibits no tenderness  Abdominal: Soft  Bowel sounds are normal  He exhibits no distension and no mass  There is no tenderness  There is no rebound and no guarding  Umbilical hernia  Left-sided weakness of groin  Musculoskeletal: Normal range of motion  He exhibits no edema, tenderness or deformity     Lymphadenopathy:     He has no cervical adenopathy  Neurological: He is alert and oriented to person, place, and time  No cranial nerve deficit  Coordination normal    Skin: Skin is dry  No rash noted  He is not diaphoretic  No erythema  No pallor  Sebaceous cyst of left neck  Skin tags of neck, multiple pigmented nevi of back  Psychiatric: He has a normal mood and affect  His behavior is normal  Thought content normal    Nursing note and vitals reviewed  Skin excision  Date/Time: 8/13/2018 2:56 PM  Performed by: Anastasiya García by: Cornell Wilkes     Procedure Details - Skin Excision:     Number of Lesions:  1  Lesion 1:     Body area:  [address] location:  Neck       Malignancy: benign lesion      Destruction method comment:  15 blade scalpel    Repair type:  Linear closure (3-0 Vicryl and 4-0 Monocryl)              By signing my name below, Leatha FERGUSON Mt, attest that this documentation has been prepared under the direction and in the presence of Macie Cleveland MD  Electronically Signed: Veda Zhang Mt  08/13/18  Macie FERGUSON personally performed the services described in this documentation  All medical record entries made by the scribe were at my direction and in my presence  I have reviewed the chart and discharge instructions and agree that the record reflects my personal performance and is accurate and complete  Macie Cleveland MD  08/13/18

## 2018-08-15 LAB
CLINICAL INFO: NORMAL
PATH REPORT.FINAL DX SPEC: NORMAL
PROCEDURE TYPE: NORMAL
SPECIMEN SOURCE: NORMAL

## 2018-08-27 ENCOUNTER — OFFICE VISIT (OUTPATIENT)
Dept: SURGERY | Facility: HOSPITAL | Age: 53
End: 2018-08-27

## 2018-08-27 VITALS
WEIGHT: 224 LBS | SYSTOLIC BLOOD PRESSURE: 162 MMHG | HEIGHT: 72 IN | BODY MASS INDEX: 30.34 KG/M2 | DIASTOLIC BLOOD PRESSURE: 96 MMHG | TEMPERATURE: 99.9 F

## 2018-08-27 DIAGNOSIS — Z98.890 POST-OPERATIVE STATE: Primary | ICD-10-CM

## 2018-08-27 PROCEDURE — 99024 POSTOP FOLLOW-UP VISIT: CPT | Performed by: SURGERY

## 2018-08-27 NOTE — PROGRESS NOTES
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    This was reviewed with the patient  He does not require further follow-up  He will continue to monitor his wound  He will call with any changes or concerns  Colonoscopy was discussed at the patient's last visit  This was followed up today  Patient claims his insurance is changing at this time and he is unsure if colonoscopy would be covered at this facility  He plans to have that information within the next few weeks and will call the office to schedule a colonoscopy at his convenience  Otherwise he states he will have his colonoscopy done where it is covered  He does understand the importance of having a colonoscopy although he denies family history of colon cancer or bowel disease  HPI:  Radha Demarco is a 47 y o male who comes in today for postoperative check after recent of left neck cyst    Patient has healed well  He has no complaints  He did not require pain medication postoperatively  He denies any redness, swelling, drainage or fevers or chills postoperatively  Colonoscopy was again discussed  Patient wishes to wait until his new insurance is established      Reports:   Pathology consistent with benign ruptured follicular cyst    ROS:  General ROS: negative for - chills, fatigue, fever or night sweats, weight loss  Respiratory ROS: no cough, shortness of breath, or wheezing  Cardiovascular ROS: no chest pain or dyspnea on exertion  Genito-Urinary ROS: no dysuria, trouble voiding, or hematuria  Musculoskeletal ROS: negative for - gait disturbance, joint pain or muscle pain  Neurological ROS: no TIA or stroke symptoms  Abdomen:   Denies pain, nausea, vomiting, change in bowel habits, bloody stools  Skin:  Incision site as above    ALLERGIES  Patient has no known allergies  Current Outpatient Prescriptions:     multivitamin (THERAGRAN) TABS, Take 1 tablet by mouth daily, Disp: , Rfl:   Past Medical History:   Diagnosis Date    Abscess     resolved 1/29/2016    Keratotic lesion     last assessed 5/2/2015    Localized adiposity     last assessed 10/20/2012    Patellofemoral syndrome of right knee     last assessed 4/28/2014    Skin growth     last assessed 4/21/2015    Squamous cell carcinoma of skin     Synovial cyst of popliteal space     last assessed 4/28/2014     Past Surgical History:   Procedure Laterality Date    APPENDECTOMY  02/08/2010    Vlad Mancilla MD     Family History   Problem Relation Age of Onset    No Known Problems Mother     No Known Problems Father       reports that he has never smoked   He has never used smokeless tobacco     PHYSICAL EXAM  General: normal, cooperative, no distress  Incision: clean, dry, and intact and healing well  No erythema, swelling, fluctuance, induration, drainage, or other signs of infection      Physical Exam

## 2018-08-27 NOTE — LETTER
August 27, 2018     Brendon Jackson DO  [address]  [address]  [facility]    Patient: Marguerite French   YOB: 1965   Date of Visit: 8/27/2018       Dear Dr Marielos Negron: Thank you for referring Kari Robles to me for evaluation  Below are my notes for this consultation  If you have questions, please do not hesitate to call me  I look forward to following your patient along with you           Sincerely,        Cm Hartmann MD        CC: No Recipients

## 2018-08-27 NOTE — PATIENT INSTRUCTIONS
Skin incision is well-healed following resection of ruptured follicular cyst    Pathology is benign  Patient does not require further follow-up for this reason  Patient should have a colonoscopy  He has never had a colonoscopy  He wishes to wait stating that his insurance will be changing within the next couple weeks  He will call the office at his convenience to schedule this procedure if it is covered at this facility by his [facility]

## 2018-08-27 NOTE — PROGRESS NOTES
Assessment/Plan: Gerhardt Fries is a 47year old male who presents today status post excision of an epidermal cyst of neck performed on 8/13/18 in office  Physical exam revealed __  Obese- Discussed diet and exercise in the context of weight loss  No problem-specific Assessment & Plan notes found for this encounter  There are no diagnoses linked to this encounter  Subjective:      Patient ID: Naima Cook is a 47 y o  male  Gerhardt Fries is a 47year old male who presents today status post excision of an epidermal cyst of neck performed on 8/13/18 in office  He reports he is     He is obese with a BMI of 30 38  The following portions of the patient's history were reviewed and updated as appropriate: allergies, current medications, past family history, past medical history, past social history, past surgical history and problem list     Review of Systems      Objective:      /96   Temp 99 9 °F (37 7 °C)   Ht 6' (1 829 m)   Wt 102 kg (224 lb)   BMI 30 38 kg/m²          Physical Exam   Constitutional:   Obese  By signing my name below, IPatrick, attest that this documentation has been prepared under the direction and in the presence of Nic Santacruz MD  Electronically Signed: Veda Thompson  08/27/18  Nic FERGUSON, personally performed the services described in this documentation  All medical record entries made by the scribe were at my direction and in my presence  I have reviewed the chart and discharge instructions and agree that the record reflects my personal performance and is accurate and complete  Nic Satnacruz MD  08/27/18

## 2018-09-04 ENCOUNTER — TELEPHONE (OUTPATIENT)
Dept: OBGYN CLINIC | Facility: HOSPITAL | Age: 53
End: 2018-09-04

## 2018-09-04 NOTE — TELEPHONE ENCOUNTER
Spoke with patients wife informed her that I spoke with Mat Hou and status is still pending and in medical review  Should here a determination by tomorrow if not I will call them back after lunch to check status again for patient

## 2018-09-06 ENCOUNTER — TELEPHONE (OUTPATIENT)
Dept: OBGYN CLINIC | Facility: HOSPITAL | Age: 53
End: 2018-09-06

## 2018-09-06 NOTE — TELEPHONE ENCOUNTER
Spoke with patient regarding denial of insurance  Informed him that his insurance denied it due to lack of 3 months of conservative treatment  I suggested to set up an appt with Dr Leslie Kelley to see what can be the next step of treatment however he was not pleased and didn't want to set up an appt

## 2018-09-12 ENCOUNTER — TELEPHONE (OUTPATIENT)
Dept: OBGYN CLINIC | Facility: HOSPITAL | Age: 53
End: 2018-09-12

## 2018-09-13 ENCOUNTER — OFFICE VISIT (OUTPATIENT)
Dept: OBGYN CLINIC | Facility: CLINIC | Age: 53
End: 2018-09-13
Payer: COMMERCIAL

## 2018-09-13 VITALS
SYSTOLIC BLOOD PRESSURE: 175 MMHG | WEIGHT: 224 LBS | BODY MASS INDEX: 30.34 KG/M2 | HEART RATE: 80 BPM | HEIGHT: 72 IN | DIASTOLIC BLOOD PRESSURE: 110 MMHG

## 2018-09-13 DIAGNOSIS — M17.11 OSTEOARTHROSIS, LOCALIZED, PRIMARY, KNEE, RIGHT: Primary | ICD-10-CM

## 2018-09-13 PROCEDURE — 99213 OFFICE O/P EST LOW 20 MIN: CPT | Performed by: PHYSICIAN ASSISTANT

## 2018-09-13 PROCEDURE — 20610 DRAIN/INJ JOINT/BURSA W/O US: CPT | Performed by: PHYSICIAN ASSISTANT

## 2018-09-13 RX ORDER — LIDOCAINE HYDROCHLORIDE 10 MG/ML
7 INJECTION, SOLUTION INFILTRATION; PERINEURAL
Status: COMPLETED | OUTPATIENT
Start: 2018-09-13 | End: 2018-09-13

## 2018-09-13 RX ORDER — BETAMETHASONE SODIUM PHOSPHATE AND BETAMETHASONE ACETATE 3; 3 MG/ML; MG/ML
6 INJECTION, SUSPENSION INTRA-ARTICULAR; INTRALESIONAL; INTRAMUSCULAR; SOFT TISSUE
Status: COMPLETED | OUTPATIENT
Start: 2018-09-13 | End: 2018-09-13

## 2018-09-13 RX ADMIN — LIDOCAINE HYDROCHLORIDE 7 ML: 10 INJECTION, SOLUTION INFILTRATION; PERINEURAL at 15:18

## 2018-09-13 RX ADMIN — BETAMETHASONE SODIUM PHOSPHATE AND BETAMETHASONE ACETATE 6 MG: 3; 3 INJECTION, SUSPENSION INTRA-ARTICULAR; INTRALESIONAL; INTRAMUSCULAR; SOFT TISSUE at 15:18

## 2018-09-13 NOTE — ASSESSMENT & PLAN NOTE
Findings and treatment options were discussed with the patient  Offered patient a cortisone injection to the right knee joint today  He accepted and tolerated the procedure well  Advised to apply cold compress today  He was instructed on a home exercise program including quadriceps strengthening and stationary bicycle  Continue NSAIDs and ice  He will call if cortisone injection wears off to try to resubmit for viscosupplementation injections  All questions were answered to patient's satisfaction  Plan discussed with Dr Lulu Lundberg

## 2018-09-13 NOTE — PROGRESS NOTES
Assessment:     1  Osteoarthrosis, localized, primary, knee, right          Plan:    Problem List Items Addressed This Visit        Musculoskeletal and Integument    Osteoarthrosis, localized, primary, knee, right - Primary     Findings and treatment options were discussed with the patient  Offered patient a cortisone injection to the right knee joint today  He accepted and tolerated the procedure well  Advised to apply cold compress today  He was instructed on a home exercise program including quadriceps strengthening and stationary bicycle  Continue NSAIDs and ice  He will call if cortisone injection wears off to try to resubmit for viscosupplementation injections  All questions were answered to patient's satisfaction  Plan discussed with Dr Akua Dennis  Relevant Medications    lidocaine (XYLOCAINE) 1 % injection 7 mL (Completed)    betamethasone acetate-betamethasone sodium phosphate (CELESTONE) injection 6 mg (Completed)    Other Relevant Orders    Large joint arthrocentesis (Completed)           Patient ID: Parrish Fairchild is a 47 y o  male  Chief Complaint:  Right knee pain    HPI:  This is a 79-year-old male following up for right knee osteoarthritis  His insurance company denied the viscosupplementation injections  He continues to have the same symptoms in his knee, and they have worsened recently with overuse on the stationary bicycle  He would like to discuss other treatment options          Allergy:  No Known Allergies    Medications:  all current active meds have been reviewed    Past Medical History:  Past Medical History:   Diagnosis Date    Abscess     resolved 1/29/2016    Keratotic lesion     last assessed 5/2/2015    Localized adiposity     last assessed 10/20/2012    Patellofemoral syndrome of right knee     last assessed 4/28/2014    Skin growth     last assessed 4/21/2015    Squamous cell carcinoma of skin     Synovial cyst of popliteal space     last assessed 4/28/2014 Past Surgical History:  Past Surgical History:   Procedure Laterality Date    APPENDECTOMY  02/08/2010    Rosamaria Kaufman MD       Family History:  Family History   Problem Relation Age of Onset    No Known Problems Mother     No Known Problems Father        Social History:  History   Alcohol use Not on file     History   Drug use: Unknown     History   Smoking Status    Never Smoker   Smokeless Tobacco    Never Used           ROS:  Review of Systems   Constitutional: Negative  HENT: Negative  Eyes: Negative  Respiratory: Negative  Cardiovascular: Negative  Gastrointestinal: Negative  Endocrine: Negative  Musculoskeletal: Positive for arthralgias  Neurological: Negative  Psychiatric/Behavioral: Negative  Objective:  BP Readings from Last 1 Encounters:   09/13/18 (!) 175/110      Wt Readings from Last 1 Encounters:   09/13/18 102 kg (224 lb)        BMI:   Estimated body mass index is 30 38 kg/m² as calculated from the following:    Height as of this encounter: 6' (1 829 m)  Weight as of this encounter: 102 kg (224 lb)  EXAM:   Physical Exam   Constitutional: He is oriented to person, place, and time  He appears well-developed and well-nourished  No distress  HENT:   Head: Normocephalic and atraumatic  Right Ear: External ear normal    Left Ear: External ear normal    Eyes: Pupils are equal, round, and reactive to light  Right eye exhibits no discharge  Left eye exhibits no discharge  Neck: Normal range of motion  Neck supple  No tracheal deviation present  Cardiovascular: Normal rate and regular rhythm  Pulmonary/Chest: Effort normal  No respiratory distress  Abdominal: Soft  He exhibits no distension  There is no tenderness  Musculoskeletal:        Right knee: He exhibits effusion (Trace)  Left knee: He exhibits no effusion  Neurological: He is alert and oriented to person, place, and time  Skin: Skin is warm and dry   He is not diaphoretic  No erythema  Psychiatric: He has a normal mood and affect  His behavior is normal  Thought content normal      Right Knee Exam     Range of Motion   Extension: normal   Flexion: 120     Other   Erythema: absent  Sensation: normal  Pulse: present  Other tests: effusion (Trace) present    Comments:  Positive grind test  Crepitus with ROM  MJL/ LJL/Pes Bursa/ AJL: no tenderness  Patella grind test: Positive  Mild quad atrophy  Varus/Valgus: Negative  Lochman's test: Negative  Rosemary's Test: Negative  Aply's test: Negative          Left Knee Exam   Left knee exam is normal     Tenderness   The patient is experiencing no tenderness  Range of Motion   The patient has normal left knee ROM  Tests   Rosemary:  Medial - negative Lateral - negative  Lachman:  Anterior - negative      Drawer:       Posterior - negative  Varus: negative  Valgus: negative  Pivot Shift: negative  Patellar Apprehension: negative    Other   Erythema: absent  Sensation: normal  Pulse: present  Swelling: none  Effusion: no effusion present              Radiographs:  I have personally reviewed pertinent films in PACS     Right knee xrays:  Mild to moderate osteoarthritis tricompartmentally, most significant of the patellofemoral joint    Large joint arthrocentesis  Date/Time: 9/13/2018 3:18 PM  Consent given by: patient  Site marked: site marked  Timeout: Immediately prior to procedure a time out was called to verify the correct patient, procedure, equipment, support staff and site/side marked as required   Supporting Documentation  Indications: pain   Procedure Details  Location: knee - R knee  Preparation: Patient was prepped and draped in the usual sterile fashion  Needle size: 22 G  Approach: superolateral   Medications administered: 7 mL lidocaine 1 %; 6 mg betamethasone acetate-betamethasone sodium phosphate 6 (3-3) mg/mL    Patient tolerance: patient tolerated the procedure well with no immediate complications  Dressing:  Sterile dressing applied        Scribe Attestation    I,:    am acting as a scribe while in the presence of the attending physician :        I,:    personally performed the services described in this documentation    as scribed in my presence :

## 2018-09-17 ENCOUNTER — TELEPHONE (OUTPATIENT)
Dept: OBGYN CLINIC | Facility: CLINIC | Age: 53
End: 2018-09-17

## 2018-09-17 NOTE — TELEPHONE ENCOUNTER
Call from Danyelle Sherrill, Wife   Call back # [phone]  [facility]       Patient received a cortisone injection on 09/13/18  Wife would like a call to discuss patients current pain  You can reach wife at the number above  Thank you

## 2018-09-17 NOTE — TELEPHONE ENCOUNTER
Spoke with wife Ailyn Olea and advised on icing and taking over the counter pain medication not exceeding 2400 mg of Ibuprofen in 24 hrs  Wife verbalized understanding and  will call me on Thursday to f/u

## 2018-10-03 ENCOUNTER — OFFICE VISIT (OUTPATIENT)
Dept: OBGYN CLINIC | Facility: CLINIC | Age: 53
End: 2018-10-03
Payer: COMMERCIAL

## 2018-10-03 VITALS
WEIGHT: 223.8 LBS | HEART RATE: 78 BPM | BODY MASS INDEX: 30.31 KG/M2 | HEIGHT: 72 IN | DIASTOLIC BLOOD PRESSURE: 100 MMHG | SYSTOLIC BLOOD PRESSURE: 157 MMHG

## 2018-10-03 DIAGNOSIS — M17.11 OSTEOARTHROSIS, LOCALIZED, PRIMARY, KNEE, RIGHT: Primary | ICD-10-CM

## 2018-10-03 PROCEDURE — 99213 OFFICE O/P EST LOW 20 MIN: CPT | Performed by: ORTHOPAEDIC SURGERY

## 2018-10-03 RX ORDER — MELOXICAM 7.5 MG/1
7.5 TABLET ORAL DAILY
Qty: 30 TABLET | Refills: 2 | Status: SHIPPED | OUTPATIENT
Start: 2018-10-03 | End: 2019-04-11

## 2018-10-03 NOTE — PATIENT INSTRUCTIONS
To perform a straight leg raise:    - Lay on a flat surface with your legs straight and flat  - Tighten the muscle on the front of your thigh as strong as possible, straightening the knee out as far as possible  - Slightly turn your toes out without relaxing your thigh muscle  - Slowly raise your leg up over 5 seconds so that the foot is 2 feet above the ground and so the knee stays straight  - Slowly, over 5 seconds, lower your foot down so the heel taps the ground  - Repeat 10 times without letting the muscle in the front of the thigh relax  - Do this set 3 times for each leg twice daily  Ice your knee 20-30 mins every hour whenever there is swelling  Also ice in the same way for several hours after activity that causes pain or swelling

## 2018-10-03 NOTE — ASSESSMENT & PLAN NOTE
44-year-old male with right knee osteoarthritis, primarily the patellofemoral joint  His pain is all consistent with the arthritis as well as the knee effusion  It is too early to give him another cortisone injection  I have encouraged the icing and physical therapy  Low-impact exercise  He will follow up with me in the future as needed   I have also recommended regular anti-inflammatories gave him a script for meloxicam

## 2018-10-03 NOTE — PROGRESS NOTES
Assessment:     1  Osteoarthrosis, localized, primary, knee, right          Plan:     Problem List Items Addressed This Visit        Musculoskeletal and Integument    Osteoarthrosis, localized, primary, knee, right - Primary     65-year-old male with right knee osteoarthritis, primarily the patellofemoral joint  His pain is all consistent with the arthritis as well as the knee effusion  It is too early to give him another cortisone injection  I have encouraged the icing and physical therapy  Low-impact exercise  He will follow up with me in the future as needed  I have also recommended regular anti-inflammatories gave him a script for meloxicam          Relevant Medications    meloxicam (MOBIC) 7 5 mg tablet    Other Relevant Orders    Ambulatory referral to Physical Therapy           Patient ID: Stephanie Padilla is a 47 y o  male  Chief Complaint:  Continued right knee pain    Subjective:  65-year-old male with pain in his right knee  He had a cortisone injection which gave him a few weeks of pain relief, then it gradually has progressed  He now feels like it is almost worse than it was prior to his injection  He has been icing the knee regularly  For a while he was able to get on his bicycle, but now has too much pain  His pain is primarily posteriorly in the knee as well as along the tibia in the shin area  He feels a pinching if he bends his knee too much  Allergy:  No Known Allergies    Medications:  all current active meds have been reviewed    ROS:  Review of Systems   Constitutional: Negative  HENT: Negative  Eyes: Negative  Respiratory: Negative  Cardiovascular: Negative  Gastrointestinal: Negative  Endocrine: Negative  Genitourinary: Negative  Musculoskeletal: Positive for arthralgias, joint swelling and myalgias  Skin: Negative  Allergic/Immunologic: Negative  Neurological: Negative  Hematological: Negative  Psychiatric/Behavioral: Negative  Objective:  BP Readings from Last 1 Encounters:   10/03/18 157/100      Wt Readings from Last 1 Encounters:   10/03/18 102 kg (223 lb 12 8 oz)        Exam:   Physical Exam  Right Knee Exam     Comments:  No tenderness along the medial or lateral joint line  Positive patellar grind  Moderate to large size knee effusion  Stable to varus and valgus stress  Negative Lachman  Negative Rosemary's  Negative Apley's

## 2018-11-12 ENCOUNTER — EVALUATION (OUTPATIENT)
Dept: PHYSICAL THERAPY | Facility: CLINIC | Age: 53
End: 2018-11-12
Payer: COMMERCIAL

## 2018-11-12 DIAGNOSIS — M17.11 OSTEOARTHROSIS, LOCALIZED, PRIMARY, KNEE, RIGHT: ICD-10-CM

## 2018-11-12 PROCEDURE — 97161 PT EVAL LOW COMPLEX 20 MIN: CPT | Performed by: PHYSICAL THERAPIST

## 2018-11-12 PROCEDURE — G8990 OTHER PT/OT CURRENT STATUS: HCPCS | Performed by: PHYSICAL THERAPIST

## 2018-11-12 PROCEDURE — G8991 OTHER PT/OT GOAL STATUS: HCPCS | Performed by: PHYSICAL THERAPIST

## 2018-11-12 NOTE — PROGRESS NOTES
PT Evaluation     Today's date: 2018  Patient name: Janice Burnett  : 1965  MRN: [id number]  Referring provider: Mariama Pastrana MD  Dx:   Encounter Diagnosis     ICD-10-CM    1  Osteoarthrosis, localized, primary, knee, right M17 11 Ambulatory referral to Physical Therapy                  Assessment  Impairments: abnormal gait, abnormal muscle firing, abnormal or restricted ROM, activity intolerance, impaired balance, impaired physical strength, lacks appropriate home exercise program and pain with function    Assessment details: Janice Burnett is a 47 y o  male who presents with pain, decreased strength, decreased ROM and decreased joint mobility  Due to these impairments, patient has difficulty performing a/iadls, recreational activities, work-related activities and engaging in social activities  Patient's clinical presentation is consistent with their referring diagnosis of Osteoarthrosis, localized, primary, knee, right  Patient has been educated in home exercise program and plan of care  Patient would benefit from skilled physical therapy services to address their aforementioned functional limitations and progress towards prior level of function and independence with home exercise program    Understanding of Dx/Px/POC: good   Prognosis: good    Goals  Short Term Goals: Target Date 4 weeks  1  Pt will initiate and advance HEP  2  Pt will have full prom   3  Pt will have 0/10 pain at rest      Long Term Goals: Target Date 8 weeks  1  Pt will demonstrate independence in HEP  2  Pt will have full arom of the knee   3  Pt will have full strength of B LE  4   Pt will have <2/10 pain with activity      Plan  Patient would benefit from: skilled PT  Planned modality interventions: cryotherapy, electrical stimulation/Russian stimulation and thermotherapy: hydrocollator packs  Planned therapy interventions: joint mobilization, manual therapy, patient education, postural training, activity modification, abdominal trunk stabilization, body mechanics training, flexibility, functional ROM exercises, graded exercise, home exercise program, neuromuscular re-education, strengthening, stretching, therapeutic activities, therapeutic exercise, motor coordination training, muscle pump exercises, gait training, balance/weight bearing training, ADL training and breathing training  Frequency: 1x week  Duration in weeks: 8  Plan of Care beginning date: 2018  Plan of Care expiration date: 2019  Treatment plan discussed with: patient        Subjective Evaluation    History of Present Illness  Mechanism of injury: Pt notes that he has had right knee pain for the past year plus  He notes that he is a  and is constantly up and down from the ground  He notes less pain in standing but increased pain with squatting, kneeling, and getting to/from his car  He notes that he has had two injections in the knee which have not helped  He notes that he has applied for viscosupplementation but was denied secondary to not exhausting conservative treatment first  Pt notes that he has more pain in the knee in the am when he wakes, and has to have help putting on his shoes  Pt is in the right lateral knee that sometimes goes to the back of the knee, and sometimes travels down his shin  Pain  Current pain rating: 3  At best pain ratin  At worst pain rating: 10  Location: right knee  Quality: burning, dull ache, throbbing, tight and sharp  Progression: worsening    Treatments  Previous treatment: injection treatment  Current treatment: physical therapy  Patient Goals  Patient goals for therapy: decreased pain, increased motion, independence with ADLs/IADLs, increased strength and return to sport/leisure activities          Objective     Observations     Right Knee   Positive for edema       Additional Observation Details  + edema distal to the right vastus lateralis with red edson from injection site the size of a pencil point    Palpation     Right Tenderness of the distal biceps femoris, lateral gastrocnemius and medial gastrocnemius  Tenderness     Right Knee   Tenderness in the fibular head, ITB, lateral joint line and medial joint line  Active Range of Motion   Left Knee   Prone flexion: 120 degrees   Extension: 0 degrees     Right Knee   Prone flexion: 90 degrees with pain  Extension: -5 degrees with pain    Strength/Myotome Testing     Left Hip   Planes of Motion   Flexion: 4+  Extension: 3+  Abduction: 4+    Right Hip   Planes of Motion   Flexion: 3+ (pain)  Extension: 3+  Abduction: 3+    Left Knee   Flexion: 5  Extension: 5  Quadriceps contraction: good    Right Knee   Flexion: 3+ (reproduce cc)  Extension: 3- (reproduce cc)  Quadriceps contraction: good    Tests     Right Knee   Positive bounce home, medial Rosemary and patellar compression  Ambulation     Observational Gait   Gait: antalgic and asymmetric   Increased left stance time and right swing time  Decreased walking speed, stride length, right stance time, left swing time, left step length and right step length  Left foot contact pattern: heel to toe  Right foot contact pattern: heel to toe  Left arm swing: decreased  Right arm swing: decreased  Base of support: increased    Functional Assessment   Squat   Pain, trunk lean left, sitting toward left side and right valgus  Lunge     Right Leg  Unable to perform         Flowsheet Rows      Most Recent Value   PT/OT G-Codes   Current Score  47   Projected Score  63   Assessment Type  Evaluation   G code set  Other PT/OT Primary   Other PT Primary Current Status ()  CK   Other PT Primary Goal Status ()  CJ          Precautions: chronic right knee pain      Daily Treatment Diary       Manuals                                                                 Exercise Diary Modalities             CP 10' post

## 2018-11-23 ENCOUNTER — OFFICE VISIT (OUTPATIENT)
Dept: PHYSICAL THERAPY | Facility: CLINIC | Age: 53
End: 2018-11-23
Payer: COMMERCIAL

## 2018-11-23 DIAGNOSIS — M17.11 OSTEOARTHROSIS, LOCALIZED, PRIMARY, KNEE, RIGHT: Primary | ICD-10-CM

## 2018-11-23 PROCEDURE — 97110 THERAPEUTIC EXERCISES: CPT | Performed by: PHYSICAL THERAPIST

## 2018-11-23 PROCEDURE — 97140 MANUAL THERAPY 1/> REGIONS: CPT | Performed by: PHYSICAL THERAPIST

## 2018-11-23 NOTE — PROGRESS NOTES
Daily Note     Today's date: 2018  Patient name: Cata Parisi  : 1965  MRN: [id number]  Referring provider: Chele Molina MD  Dx:   Encounter Diagnosis     ICD-10-CM    1  Osteoarthrosis, localized, primary, knee, right M17 11                   Subjective: pt notes that his exercises are all going well except for the knee bending which he thinks is now worse  He notes that he is unable to do his bike because of pain in the knee now  Objective: See treatment diary below  Precautions: chronic right knee pain      Daily Treatment Diary       Manuals             Right tibial IR and ER mobs DB            Right patella mobs DB            Right knee flexion and ext stretch DB            Right gastroc stretch DB            Right gastroc TPR DB                                      Daily exercises             Gatroc stretch 30''x3            Soleus stretch 15''x2            Supine 90-90 knee flexion stretch 3''x20            Seated knee ext 2x10                                                                                                                                                                                                               Modalities             CP                              Assessment: pt able to attain 115 degrees of flexion post manuals today with out pain      Plan: Continue per plan of care

## 2018-11-27 ENCOUNTER — OFFICE VISIT (OUTPATIENT)
Dept: PHYSICAL THERAPY | Facility: CLINIC | Age: 53
End: 2018-11-27
Payer: COMMERCIAL

## 2018-11-27 DIAGNOSIS — M17.11 OSTEOARTHROSIS, LOCALIZED, PRIMARY, KNEE, RIGHT: Primary | ICD-10-CM

## 2018-11-27 PROCEDURE — 97014 ELECTRIC STIMULATION THERAPY: CPT | Performed by: PHYSICAL THERAPIST

## 2018-11-27 PROCEDURE — 97140 MANUAL THERAPY 1/> REGIONS: CPT | Performed by: PHYSICAL THERAPIST

## 2018-11-27 PROCEDURE — 97010 HOT OR COLD PACKS THERAPY: CPT | Performed by: PHYSICAL THERAPIST

## 2018-11-27 NOTE — PROGRESS NOTES
Daily Note     Today's date: 2018  Patient name: Emy Setting  : 1965  MRN: [id number]  Referring provider: Amanda Haque MD  Dx:   Encounter Diagnosis     ICD-10-CM    1  Osteoarthrosis, localized, primary, knee, right M17 11                   Subjective: pt notes that he felt great for 45 min post treatment and then pain started to build  Today is very painful and having difficulty with bending, and walking  Objective: See treatment diary below    Precautions: chronic right knee pain      Daily Treatment Diary       Manuals            Right tibial IR and ER mobs DB DB           Right patella mobs DB DB           Right knee flexion and ext stretch DB DB           Right gastroc stretch DB DB           Right gastroc TPR DB np                                     Daily exercises             Gatroc stretch 30''x3            Soleus stretch 15''x2            Supine 90-90 knee flexion stretch 3''x20            Seated knee ext 2x10                                                                                                                                                                                                               Modalities             CP  2 sets of 10' with tens                          Assessment: pt initially with severely inflammed knee that was resistant to any manuals secondary to pain  Performed cp and tens to alleviate sensitivity  After 10 minutes was able to perform manuals with out pain, and improved motion with tens unit still running  Then finished with 10 minutes of tens/cp  Pt with 70 degrees of knee flexion pre treatment with 6/10 pain, and 110 degrees of flexion post with 2/10 pain  Plan: Continue per plan of care  advised pt that a tens unit purchase may be good to help desensitize the nervous system with CP

## 2018-12-12 ENCOUNTER — OFFICE VISIT (OUTPATIENT)
Dept: OBGYN CLINIC | Facility: CLINIC | Age: 53
End: 2018-12-12
Payer: COMMERCIAL

## 2018-12-12 VITALS
HEIGHT: 72 IN | DIASTOLIC BLOOD PRESSURE: 99 MMHG | SYSTOLIC BLOOD PRESSURE: 145 MMHG | HEART RATE: 85 BPM | BODY MASS INDEX: 30.1 KG/M2 | WEIGHT: 222.2 LBS

## 2018-12-12 DIAGNOSIS — M17.11 OSTEOARTHROSIS, LOCALIZED, PRIMARY, KNEE, RIGHT: Primary | ICD-10-CM

## 2018-12-12 PROCEDURE — 99213 OFFICE O/P EST LOW 20 MIN: CPT | Performed by: ORTHOPAEDIC SURGERY

## 2018-12-12 NOTE — PROGRESS NOTES
Assessment:     1  Osteoarthrosis, localized, primary, knee, right          Plan:     Problem List Items Addressed This Visit        Musculoskeletal and Integument    Osteoarthrosis, localized, primary, knee, right - Primary     13-year-old male with right knee osteoarthritis, primarily the patellofemoral joint  He continues to have symptoms despite completion of physical therapy, cortisone injections and use of anti-inflammatory Mobic  Recommend resubmitting for viscosupplementation injections  He is to continue icing, NSAIDs and home exercises in the meantime  He will follow-up after insurance authorization to begin series  All questions were answered to patient's satisfaction  Plan discussed with Dr Cavazos Cleaves  Relevant Orders    Injection procedure prior authorization           Patient ID: Jesus Grant is a 47 y o  male  Chief Complaint:  Continued right knee pain    Subjective:  13-year-old male following up for right knee osteoarthritis, worst in patellofemoral compartment  He states that aching pain in his knees progressively worsening  He attended physical therapy and did not feel much improvement  He continues to do the home exercises any way  He has been taking NSAIDs with no relief  He had very short-term relief with the cortisone injection given in the past   He would like to discuss trying get joint supplement injections approved  Allergy:  No Known Allergies    Medications:  all current active meds have been reviewed    ROS:  Review of Systems   Constitutional: Negative  HENT: Negative  Eyes: Negative  Respiratory: Negative  Cardiovascular: Negative  Gastrointestinal: Negative  Endocrine: Negative  Genitourinary: Negative  Musculoskeletal: Positive for arthralgias, joint swelling and myalgias  Skin: Negative  Allergic/Immunologic: Negative  Neurological: Negative  Hematological: Negative  Psychiatric/Behavioral: Negative  Objective:  BP Readings from Last 1 Encounters:   12/12/18 145/99      Wt Readings from Last 1 Encounters:   12/12/18 101 kg (222 lb 3 2 oz)        Exam:   Physical Exam  Right Knee Exam     Comments:  No tenderness along the medial or lateral joint line  Positive patellar grind  Moderate to large size knee effusion  Stable to varus and valgus stress  Negative Lachman  Negative Rosemary's  Negative Apley's

## 2018-12-12 NOTE — ASSESSMENT & PLAN NOTE
13-year-old male with right knee osteoarthritis, primarily the patellofemoral joint  He continues to have symptoms despite completion of physical therapy, cortisone injections and use of anti-inflammatory Mobic  Recommend resubmitting for viscosupplementation injections  He is to continue icing, NSAIDs and home exercises in the meantime  He will follow-up after insurance authorization to begin series  All questions were answered to patient's satisfaction  Plan discussed with Dr Deven Castillo

## 2018-12-28 ENCOUNTER — TELEPHONE (OUTPATIENT)
Dept: OBGYN CLINIC | Facility: HOSPITAL | Age: 53
End: 2018-12-28

## 2018-12-28 NOTE — TELEPHONE ENCOUNTER
Patient is calling to check the status of his Euflexxa injections  His wife stating that they received a letter stating that it was approved on 12/17

## 2019-01-02 NOTE — TELEPHONE ENCOUNTER
Patient calling in again requesting an update on injections  Please contact patient at [phone]

## 2019-01-10 NOTE — TELEPHONE ENCOUNTER
I will call patient today once I find out from [address] [place] [address]   which injection was approved  I only see a denial for Orthovisc in patients chart

## 2019-01-10 NOTE — TELEPHONE ENCOUNTER
Patient is calling back again requesting an update on injections  Patient states he knows the injections are approved because he received an approval letter  Please contact patient

## 2019-01-15 ENCOUNTER — OFFICE VISIT (OUTPATIENT)
Dept: OBGYN CLINIC | Facility: CLINIC | Age: 54
End: 2019-01-15
Payer: COMMERCIAL

## 2019-01-15 VITALS
DIASTOLIC BLOOD PRESSURE: 119 MMHG | HEIGHT: 72 IN | SYSTOLIC BLOOD PRESSURE: 185 MMHG | BODY MASS INDEX: 30.48 KG/M2 | WEIGHT: 225 LBS

## 2019-01-15 DIAGNOSIS — M17.11 OSTEOARTHROSIS, LOCALIZED, PRIMARY, KNEE, RIGHT: Primary | ICD-10-CM

## 2019-01-15 PROCEDURE — 20610 DRAIN/INJ JOINT/BURSA W/O US: CPT | Performed by: PHYSICIAN ASSISTANT

## 2019-01-15 PROCEDURE — 20610 DRAIN/INJ JOINT/BURSA W/O US: CPT | Performed by: ORTHOPAEDIC SURGERY

## 2019-01-15 RX ORDER — HYALURONATE SODIUM 10 MG/ML
20 SYRINGE (ML) INTRAARTICULAR
Status: COMPLETED | OUTPATIENT
Start: 2019-01-15 | End: 2019-01-15

## 2019-01-15 RX ORDER — RUTIN/HESP/BIOFLAV/C/HERBAL196 40-25-50MG
TABLET ORAL
COMMUNITY
End: 2019-04-11

## 2019-01-15 RX ADMIN — Medication 20 MG: at 14:43

## 2019-01-15 NOTE — PROGRESS NOTES
Assessment:     1  Osteoarthrosis, localized, primary, knee, right          Plan:     Problem List Items Addressed This Visit        Musculoskeletal and Integument    Osteoarthrosis, localized, primary, knee, right - Primary     The 1st of 3 right knee Euflexxa injections was given today  He tolerated the procedure well  Advised to apply cold compress today  Follow-up in 1 week for the 2nd injection  All questions were answered to patient's satisfaction  Relevant Medications    Sodium Hyaluronate 20 mg (Completed)    Other Relevant Orders    Large joint arthrocentesis (Completed)           Patient ID: Denis Khan is a 46 y o  male  Chief Complaint:  Continued right knee pain    Subjective:  25-year-old male following up for right knee osteoarthritis, worst in patellofemoral compartment  He is here for the 1st of 3 right knee Euflexxa injections  He continues to feel the same symptoms in his knee  Allergy:  No Known Allergies    Medications:  all current active meds have been reviewed    ROS:  Review of Systems   Constitutional: Negative  HENT: Negative  Eyes: Negative  Respiratory: Negative  Cardiovascular: Negative  Gastrointestinal: Negative  Endocrine: Negative  Genitourinary: Negative  Musculoskeletal: Positive for arthralgias, joint swelling and myalgias  Skin: Negative  Allergic/Immunologic: Negative  Neurological: Negative  Hematological: Negative  Psychiatric/Behavioral: Negative  Objective:  BP Readings from Last 1 Encounters:   01/15/19 (!) 185/119      Wt Readings from Last 1 Encounters:   01/15/19 102 kg (225 lb)        Exam:   Physical Exam   Constitutional: He is oriented to person, place, and time  He appears well-developed  HENT:   Head: Normocephalic and atraumatic  Eyes: Conjunctivae and EOM are normal    Neck: Neck supple  Musculoskeletal:        Right knee: He exhibits effusion (Grade 1)     Neurological: He is alert and oriented to person, place, and time  Skin: Skin is warm  Psychiatric: He has a normal mood and affect  Nursing note and vitals reviewed  Right Knee Exam     Other   Erythema: absent  Sensation: normal  Pulse: present  Other tests: effusion (Grade 1) present    Comments:  No tenderness along the medial or lateral joint line  Positive patellar grind  Stable to varus and valgus stress  Negative Lachman  Negative Rosemary's  Negative Apley's              Large joint arthrocentesis  Date/Time: 1/15/2019 2:43 PM  Consent given by: patient  Site marked: site marked  Timeout: Immediately prior to procedure a time out was called to verify the correct patient, procedure, equipment, support staff and site/side marked as required   Supporting Documentation  Indications: pain   Procedure Details  Location: knee - R knee  Preparation: Patient was prepped and draped in the usual sterile fashion  Needle size: 22 G  Approach: superior  Medications administered: 20 mg Sodium Hyaluronate 20 MG/2ML    Patient tolerance: patient tolerated the procedure well with no immediate complications  Dressing:  Sterile dressing applied

## 2019-01-15 NOTE — ASSESSMENT & PLAN NOTE
The 1st of 3 right knee Euflexxa injections was given today  He tolerated the procedure well  Advised to apply cold compress today  Follow-up in 1 week for the 2nd injection  All questions were answered to patient's satisfaction

## 2019-01-22 ENCOUNTER — OFFICE VISIT (OUTPATIENT)
Dept: OBGYN CLINIC | Facility: CLINIC | Age: 54
End: 2019-01-22
Payer: COMMERCIAL

## 2019-01-22 VITALS
WEIGHT: 225 LBS | DIASTOLIC BLOOD PRESSURE: 85 MMHG | HEIGHT: 72 IN | SYSTOLIC BLOOD PRESSURE: 160 MMHG | BODY MASS INDEX: 30.48 KG/M2

## 2019-01-22 DIAGNOSIS — M17.11 OSTEOARTHROSIS, LOCALIZED, PRIMARY, KNEE, RIGHT: Primary | ICD-10-CM

## 2019-01-22 PROCEDURE — 20610 DRAIN/INJ JOINT/BURSA W/O US: CPT | Performed by: ORTHOPAEDIC SURGERY

## 2019-01-22 PROCEDURE — 20610 DRAIN/INJ JOINT/BURSA W/O US: CPT | Performed by: PHYSICIAN ASSISTANT

## 2019-01-22 RX ORDER — HYALURONATE SODIUM 10 MG/ML
20 SYRINGE (ML) INTRAARTICULAR
Status: COMPLETED | OUTPATIENT
Start: 2019-01-22 | End: 2019-01-22

## 2019-01-22 RX ADMIN — Medication 20 MG: at 15:24

## 2019-01-22 NOTE — PROGRESS NOTES
Assessment:     1  Osteoarthrosis, localized, primary, knee, right          Plan:     Problem List Items Addressed This Visit        Musculoskeletal and Integument    Osteoarthrosis, localized, primary, knee, right - Primary     The 2nd of 3 right knee Euflexxa injections was given today  He tolerated the procedure well  Advised to apply cold compress today  Follow-up in 1 week for the 3rd injection  All questions were answered to patient's satisfaction  Patient ID: Kanu Knox is a 46 y o  male  Chief Complaint:  Continued right knee pain    Subjective:  58-year-old male following up for right knee osteoarthritis, worst in patellofemoral compartment  He is here for the 2nd of 3 right knee Euflexxa injections  He felt relief after the 1st injection  Allergy:  No Known Allergies    Medications:  all current active meds have been reviewed    ROS:  Review of Systems   Constitutional: Negative  HENT: Negative  Eyes: Negative  Respiratory: Negative  Cardiovascular: Negative  Gastrointestinal: Negative  Endocrine: Negative  Genitourinary: Negative  Musculoskeletal: Positive for arthralgias, joint swelling and myalgias  Skin: Negative  Allergic/Immunologic: Negative  Neurological: Negative  Hematological: Negative  Psychiatric/Behavioral: Negative  Objective:  BP Readings from Last 1 Encounters:   01/22/19 160/85      Wt Readings from Last 1 Encounters:   01/22/19 102 kg (225 lb)        Exam:   Physical Exam   Constitutional: He is oriented to person, place, and time  He appears well-developed  HENT:   Head: Normocephalic and atraumatic  Eyes: Conjunctivae and EOM are normal    Neck: Neck supple  Musculoskeletal:        Right knee: He exhibits effusion (Grade 1)  Neurological: He is alert and oriented to person, place, and time  Skin: Skin is warm  Psychiatric: He has a normal mood and affect     Nursing note and vitals reviewed  Right Knee Exam     Other   Erythema: absent  Sensation: normal  Pulse: present  Other tests: effusion (Grade 1) present    Comments:  No tenderness along the medial or lateral joint line  Positive patellar grind  Stable to varus and valgus stress  Negative Lachman  Negative Rosemary's  Negative Apley's              Large joint arthrocentesis  Date/Time: 1/22/2019 3:24 PM  Consent given by: patient  Site marked: site marked  Timeout: Immediately prior to procedure a time out was called to verify the correct patient, procedure, equipment, support staff and site/side marked as required   Supporting Documentation  Indications: pain   Procedure Details  Location: knee - R knee  Preparation: Patient was prepped and draped in the usual sterile fashion  Needle size: 22 G  Approach: superior  Medications administered: 20 mg Sodium Hyaluronate 20 MG/2ML    Patient tolerance: patient tolerated the procedure well with no immediate complications  Dressing:  Sterile dressing applied

## 2019-01-22 NOTE — ASSESSMENT & PLAN NOTE
The 2nd of 3 right knee Euflexxa injections was given today  He tolerated the procedure well  Advised to apply cold compress today  Follow-up in 1 week for the 3rd injection  All questions were answered to patient's satisfaction

## 2019-01-29 ENCOUNTER — OFFICE VISIT (OUTPATIENT)
Dept: OBGYN CLINIC | Facility: CLINIC | Age: 54
End: 2019-01-29
Payer: COMMERCIAL

## 2019-01-29 VITALS
HEIGHT: 72 IN | WEIGHT: 225.4 LBS | DIASTOLIC BLOOD PRESSURE: 65 MMHG | SYSTOLIC BLOOD PRESSURE: 160 MMHG | BODY MASS INDEX: 30.53 KG/M2

## 2019-01-29 DIAGNOSIS — M17.11 OSTEOARTHROSIS, LOCALIZED, PRIMARY, KNEE, RIGHT: Primary | ICD-10-CM

## 2019-01-29 PROCEDURE — 20610 DRAIN/INJ JOINT/BURSA W/O US: CPT | Performed by: ORTHOPAEDIC SURGERY

## 2019-01-29 PROCEDURE — 20610 DRAIN/INJ JOINT/BURSA W/O US: CPT | Performed by: PHYSICIAN ASSISTANT

## 2019-01-29 RX ORDER — HYALURONATE SODIUM 10 MG/ML
20 SYRINGE (ML) INTRAARTICULAR
Status: COMPLETED | OUTPATIENT
Start: 2019-01-29 | End: 2019-01-29

## 2019-01-29 RX ADMIN — Medication 20 MG: at 14:28

## 2019-01-29 NOTE — PROGRESS NOTES
Assessment:     1  Osteoarthrosis, localized, primary, knee, right          Plan:     Problem List Items Addressed This Visit        Musculoskeletal and Integument    Osteoarthrosis, localized, primary, knee, right - Primary     The 3rd of 3 right knee Euflexxa injections was given today  He tolerated the procedure well  Advised to apply cold compress today  Follow-up as needed if symptoms return  Advised patient the soonest he can have another series is in 6 months  All questions were answered to patient's satisfaction  Relevant Medications    Sodium Hyaluronate 20 mg (Completed)    Other Relevant Orders    Large joint arthrocentesis (Completed)           Patient ID: Jenni Gonzalez is a 46 y o  male  Chief Complaint:  Continued right knee pain    Subjective:  59-year-old male following up for right knee osteoarthritis, worst in patellofemoral compartment  He is here for the 3rd of 3 right knee Euflexxa injections  He felt some soreness after the 2nd injection  He has been using the stationary bicycle and feels improvement  Allergy:  No Known Allergies    Medications:  all current active meds have been reviewed    ROS:  Review of Systems   Constitutional: Negative  HENT: Negative  Eyes: Negative  Respiratory: Negative  Cardiovascular: Negative  Gastrointestinal: Negative  Endocrine: Negative  Genitourinary: Negative  Musculoskeletal: Positive for arthralgias, joint swelling and myalgias  Skin: Negative  Allergic/Immunologic: Negative  Neurological: Negative  Hematological: Negative  Psychiatric/Behavioral: Negative  Objective:  BP Readings from Last 1 Encounters:   01/29/19 160/65      Wt Readings from Last 1 Encounters:   01/29/19 102 kg (225 lb 6 4 oz)        Exam:   Physical Exam   Constitutional: He is oriented to person, place, and time  He appears well-developed  HENT:   Head: Normocephalic and atraumatic     Eyes: Conjunctivae and EOM are normal    Neck: Neck supple  Musculoskeletal:        Right knee: He exhibits effusion (Grade 1)  Neurological: He is alert and oriented to person, place, and time  Skin: Skin is warm  Psychiatric: He has a normal mood and affect  Nursing note and vitals reviewed  Right Knee Exam     Other   Erythema: absent  Sensation: normal  Pulse: present  Other tests: effusion (Grade 1) present    Comments:  No tenderness along the medial or lateral joint line  Positive patellar grind  Stable to varus and valgus stress  Negative Lachman  Negative Rosemary's  Negative Apley's              Large joint arthrocentesis  Date/Time: 1/29/2019 2:28 PM  Consent given by: patient  Site marked: site marked  Timeout: Immediately prior to procedure a time out was called to verify the correct patient, procedure, equipment, support staff and site/side marked as required   Supporting Documentation  Indications: pain   Procedure Details  Location: knee - R knee  Preparation: Patient was prepped and draped in the usual sterile fashion  Needle size: 22 G  Approach: superior  Medications administered: 20 mg Sodium Hyaluronate 20 MG/2ML    Patient tolerance: patient tolerated the procedure well with no immediate complications  Dressing:  Sterile dressing applied

## 2019-01-29 NOTE — ASSESSMENT & PLAN NOTE
The 3rd of 3 right knee Euflexxa injections was given today  He tolerated the procedure well  Advised to apply cold compress today  Follow-up as needed if symptoms return  Advised patient the soonest he can have another series is in 6 months  All questions were answered to patient's satisfaction

## 2019-04-11 ENCOUNTER — OFFICE VISIT (OUTPATIENT)
Dept: URGENT CARE | Facility: CLINIC | Age: 54
End: 2019-04-11
Payer: COMMERCIAL

## 2019-04-11 VITALS
DIASTOLIC BLOOD PRESSURE: 90 MMHG | OXYGEN SATURATION: 96 % | SYSTOLIC BLOOD PRESSURE: 158 MMHG | HEIGHT: 72 IN | WEIGHT: 227 LBS | RESPIRATION RATE: 16 BRPM | HEART RATE: 100 BPM | BODY MASS INDEX: 30.75 KG/M2 | TEMPERATURE: 98 F

## 2019-04-11 DIAGNOSIS — J68.3 REACTIVE AIRWAYS DYSFUNCTION SYNDROME WITH ACUTE EXACERBATION (HCC): Primary | ICD-10-CM

## 2019-04-11 PROCEDURE — G0382 LEV 3 HOSP TYPE B ED VISIT: HCPCS | Performed by: FAMILY MEDICINE

## 2019-04-11 RX ORDER — METHYLPREDNISOLONE 4 MG/1
TABLET ORAL
Qty: 21 TABLET | Refills: 0 | Status: SHIPPED | OUTPATIENT
Start: 2019-04-11 | End: 2019-04-15

## 2019-04-11 RX ORDER — MONTELUKAST SODIUM 10 MG/1
10 TABLET ORAL
Qty: 15 TABLET | Refills: 1 | Status: SHIPPED | OUTPATIENT
Start: 2019-04-11 | End: 2020-06-25 | Stop reason: CLARIF

## 2019-04-15 ENCOUNTER — OFFICE VISIT (OUTPATIENT)
Dept: FAMILY MEDICINE CLINIC | Facility: CLINIC | Age: 54
End: 2019-04-15
Payer: COMMERCIAL

## 2019-04-15 VITALS
TEMPERATURE: 96.8 F | SYSTOLIC BLOOD PRESSURE: 142 MMHG | DIASTOLIC BLOOD PRESSURE: 86 MMHG | HEIGHT: 72 IN | BODY MASS INDEX: 30.2 KG/M2 | OXYGEN SATURATION: 98 % | HEART RATE: 88 BPM | WEIGHT: 223 LBS

## 2019-04-15 DIAGNOSIS — J45.21 MILD INTERMITTENT REACTIVE AIRWAY DISEASE WITH ACUTE EXACERBATION: Primary | ICD-10-CM

## 2019-04-15 PROCEDURE — 3008F BODY MASS INDEX DOCD: CPT | Performed by: FAMILY MEDICINE

## 2019-04-15 PROCEDURE — 99213 OFFICE O/P EST LOW 20 MIN: CPT | Performed by: FAMILY MEDICINE

## 2019-04-15 PROCEDURE — 1036F TOBACCO NON-USER: CPT | Performed by: FAMILY MEDICINE

## 2019-04-15 RX ORDER — PREDNISONE 10 MG/1
TABLET ORAL
Qty: 21 TABLET | Refills: 0 | Status: SHIPPED | OUTPATIENT
Start: 2019-04-15 | End: 2020-06-25 | Stop reason: CLARIF

## 2019-04-15 RX ORDER — ALBUTEROL SULFATE 90 UG/1
2 AEROSOL, METERED RESPIRATORY (INHALATION) EVERY 6 HOURS PRN
Qty: 1 INHALER | Refills: 1 | Status: SHIPPED | OUTPATIENT
Start: 2019-04-15 | End: 2020-06-25 | Stop reason: CLARIF

## 2019-04-18 ENCOUNTER — TELEPHONE (OUTPATIENT)
Dept: FAMILY MEDICINE CLINIC | Facility: CLINIC | Age: 54
End: 2019-04-18

## 2019-04-18 DIAGNOSIS — J40 BRONCHITIS: Primary | ICD-10-CM

## 2019-04-18 RX ORDER — AZITHROMYCIN 250 MG/1
TABLET, FILM COATED ORAL
Qty: 6 TABLET | Refills: 0 | Status: SHIPPED | OUTPATIENT
Start: 2019-04-18 | End: 2019-04-22

## 2019-04-23 ENCOUNTER — TELEPHONE (OUTPATIENT)
Dept: FAMILY MEDICINE CLINIC | Facility: CLINIC | Age: 54
End: 2019-04-23

## 2020-06-25 ENCOUNTER — OFFICE VISIT (OUTPATIENT)
Dept: OBGYN CLINIC | Facility: CLINIC | Age: 55
End: 2020-06-25
Payer: COMMERCIAL

## 2020-06-25 ENCOUNTER — APPOINTMENT (OUTPATIENT)
Dept: RADIOLOGY | Facility: CLINIC | Age: 55
End: 2020-06-25
Payer: COMMERCIAL

## 2020-06-25 VITALS — HEIGHT: 72 IN | BODY MASS INDEX: 29.8 KG/M2 | WEIGHT: 220 LBS

## 2020-06-25 DIAGNOSIS — M25.561 RIGHT KNEE PAIN, UNSPECIFIED CHRONICITY: ICD-10-CM

## 2020-06-25 DIAGNOSIS — M17.11 OSTEOARTHROSIS, LOCALIZED, PRIMARY, KNEE, RIGHT: Primary | ICD-10-CM

## 2020-06-25 PROCEDURE — 73564 X-RAY EXAM KNEE 4 OR MORE: CPT

## 2020-06-25 PROCEDURE — 77073 BONE LENGTH STUDIES: CPT

## 2020-06-25 PROCEDURE — 3008F BODY MASS INDEX DOCD: CPT | Performed by: ORTHOPAEDIC SURGERY

## 2020-06-25 PROCEDURE — 99214 OFFICE O/P EST MOD 30 MIN: CPT | Performed by: ORTHOPAEDIC SURGERY

## 2020-06-25 PROCEDURE — 1036F TOBACCO NON-USER: CPT | Performed by: ORTHOPAEDIC SURGERY

## 2020-07-02 ENCOUNTER — TELEPHONE (OUTPATIENT)
Dept: OBGYN CLINIC | Facility: HOSPITAL | Age: 55
End: 2020-07-02

## 2020-07-02 NOTE — TELEPHONE ENCOUNTER
Karen Garsia, [address] [place] [address]    [phone]    Needing call back to discuss prior authorization for Euflexxa

## 2020-07-06 NOTE — TELEPHONE ENCOUNTER
MSG sent in raven [employer] from [address] called for pre certification info for speciality injections

## 2020-07-06 NOTE — TELEPHONE ENCOUNTER
Spoke to Tish Uribe from [place]  Patient has been calling [employer] asking for the status of his injections  Called patient and left him a message telling him the process has been started for his prior auth

## 2020-07-13 ENCOUNTER — OFFICE VISIT (OUTPATIENT)
Dept: OBGYN CLINIC | Facility: CLINIC | Age: 55
End: 2020-07-13
Payer: COMMERCIAL

## 2020-07-13 VITALS — HEIGHT: 72 IN | TEMPERATURE: 98.8 F | BODY MASS INDEX: 30.48 KG/M2 | WEIGHT: 225 LBS

## 2020-07-13 DIAGNOSIS — M17.11 ARTHRITIS OF RIGHT KNEE: Primary | ICD-10-CM

## 2020-07-13 PROCEDURE — 20610 DRAIN/INJ JOINT/BURSA W/O US: CPT | Performed by: ORTHOPAEDIC SURGERY

## 2020-07-13 RX ORDER — HYALURONATE SODIUM 10 MG/ML
20 SYRINGE (ML) INTRAARTICULAR
Status: COMPLETED | OUTPATIENT
Start: 2020-07-13 | End: 2020-07-13

## 2020-07-13 RX ADMIN — Medication 20 MG: at 14:39

## 2020-07-13 NOTE — PROGRESS NOTES
Jessica Ponce returns today for R knee euflexxa injection, 1 of 3 today      Large joint arthrocentesis: R knee  Date/Time: 7/13/2020 2:39 PM  Consent given by: patient  Site marked: site marked  Timeout: Immediately prior to procedure a time out was called to verify the correct patient, procedure, equipment, support staff and site/side marked as required   Supporting Documentation  Indications: pain   Procedure Details  Location: knee - R knee  Needle size: 20 G  Approach: anterior  Medications administered: 20 mg Sodium Hyaluronate 20 MG/2ML

## 2020-07-21 ENCOUNTER — TELEPHONE (OUTPATIENT)
Dept: OBGYN CLINIC | Facility: CLINIC | Age: 55
End: 2020-07-21

## 2020-07-21 NOTE — TELEPHONE ENCOUNTER
Patient showed up at [facility] for injections for Dr Willie Chen, patient was scheduled for [facility], when I told patient that his appointment was in [facility], patient was upset at this point, I offered patient an appointment for tomorrow, Thursday or Friday, patient refused to make an appointment saying who is going to pay for my day off, I did apologize multiple times to the patient  He stormed off saying that we can keep the injections

## 2020-07-23 ENCOUNTER — OFFICE VISIT (OUTPATIENT)
Dept: OBGYN CLINIC | Facility: CLINIC | Age: 55
End: 2020-07-23
Payer: COMMERCIAL

## 2020-07-23 VITALS
BODY MASS INDEX: 31.15 KG/M2 | HEIGHT: 72 IN | WEIGHT: 230 LBS | DIASTOLIC BLOOD PRESSURE: 70 MMHG | SYSTOLIC BLOOD PRESSURE: 142 MMHG

## 2020-07-23 DIAGNOSIS — M17.11 ARTHRITIS OF RIGHT KNEE: Primary | ICD-10-CM

## 2020-07-23 PROCEDURE — 20610 DRAIN/INJ JOINT/BURSA W/O US: CPT | Performed by: ORTHOPAEDIC SURGERY

## 2020-07-23 RX ORDER — HYALURONATE SODIUM 10 MG/ML
20 SYRINGE (ML) INTRAARTICULAR
Status: COMPLETED | OUTPATIENT
Start: 2020-07-23 | End: 2020-07-23

## 2020-07-23 RX ADMIN — Medication 20 MG: at 15:22

## 2020-07-23 NOTE — PROGRESS NOTES
Patient presents to office today for VS injection  Large joint arthrocentesis: R knee  Date/Time: 7/23/2020 3:22 PM  Consent given by: patient  Site marked: site marked  Timeout: Immediately prior to procedure a time out was called to verify the correct patient, procedure, equipment, support staff and site/side marked as required   Supporting Documentation  Indications: pain   Procedure Details  Location: knee - R knee  Needle size: 20 G  Approach: anterior  Medications administered: 20 mg Sodium Hyaluronate 20 MG/2ML          Jerry Saini MD  Adult Reconstruction  Department [address]  07/23/20  3:22 PM

## 2020-07-29 ENCOUNTER — OFFICE VISIT (OUTPATIENT)
Dept: OBGYN CLINIC | Facility: CLINIC | Age: 55
End: 2020-07-29
Payer: COMMERCIAL

## 2020-07-29 VITALS
DIASTOLIC BLOOD PRESSURE: 70 MMHG | WEIGHT: 230 LBS | HEIGHT: 72 IN | SYSTOLIC BLOOD PRESSURE: 132 MMHG | TEMPERATURE: 99.8 F | BODY MASS INDEX: 31.15 KG/M2

## 2020-07-29 DIAGNOSIS — M17.10 ARTHRITIS OF KNEE: Primary | ICD-10-CM

## 2020-07-29 PROCEDURE — 1036F TOBACCO NON-USER: CPT | Performed by: ORTHOPAEDIC SURGERY

## 2020-07-29 PROCEDURE — 3008F BODY MASS INDEX DOCD: CPT | Performed by: ORTHOPAEDIC SURGERY

## 2020-07-29 PROCEDURE — 20610 DRAIN/INJ JOINT/BURSA W/O US: CPT | Performed by: ORTHOPAEDIC SURGERY

## 2020-07-29 RX ORDER — HYALURONATE SODIUM 10 MG/ML
20 SYRINGE (ML) INTRAARTICULAR
Status: COMPLETED | OUTPATIENT
Start: 2020-07-29 | End: 2020-07-29

## 2020-07-29 RX ADMIN — Medication 20 MG: at 15:12

## 2020-07-29 NOTE — PROGRESS NOTES
Orthopaedic Surgery Note    CC: Right Knee Pain      HPI:  Liliya Devine is a 53 y  o male with a history of right knee pain  Patient presents today for 3 of 3 VS injection  Reports that this is helping with his pain but he feels that the right knee will not flex as much as the left  He has done PT and has been trying to bike and still feels he has limited motion  He does deny any connie catching or locking or the right knee  ALLERGIES:  No Known Allergies    CURRENT MEDICATIONS:  Current Outpatient Medications   Medication Sig Dispense Refill    multivitamin (THERAGRAN) TABS Take 1 tablet by mouth daily       No current facility-administered medications for this visit          PAST MEDICAL HISTORY  Past Medical History:   Diagnosis Date    Abscess     resolved 1/29/2016    Keratotic lesion     last assessed 5/2/2015    Localized adiposity     last assessed 10/20/2012    Patellofemoral syndrome of right knee     last assessed 4/28/2014    Skin growth     last assessed 4/21/2015    Squamous cell carcinoma of skin     Synovial cyst of popliteal space     last assessed 4/28/2014       SURGICAL HISTORY  Past Surgical History:   Procedure Laterality Date    APPENDECTOMY  02/08/2010    Ace Hartman MD       FAMILY HISTORY  Family History   Problem Relation Age of Onset    No Known Problems Mother     No Known Problems Father        SOCIAL HISTORY  Social History     Socioeconomic History    Marital status: /Civil Union     Spouse name: Not on file    Number of children: Not on file    Years of education: Not on file    Highest education level: Not on file   Occupational History     Comment: working full time   Social Needs    Financial resource strain: Not on file    Food insecurity:     Worry: Not on file     Inability: Not on file    Transportation needs:     Medical: Not on file     Non-medical: Not on file   Tobacco Use    Smoking status: Never Smoker    Smokeless tobacco: Never Used   Substance and Sexual Activity    Alcohol use: Not Currently     Frequency: Never    Drug use: Never    Sexual activity: Yes     Partners: Female     Birth control/protection: None   Lifestyle    Physical activity:     Days per week: Not on file     Minutes per session: Not on file    Stress: Not on file   Relationships    Social connections:     Talks on phone: Not on file     Gets together: Not on file     Attends Taoist service: Not on file     Active member of club or organization: Not on file     Attends meetings of clubs or organizations: Not on file     Relationship status: Not on file    Intimate partner violence:     Fear of current or ex partner: Not on file     Emotionally abused: Not on file     Physically abused: Not on file     Forced sexual activity: Not on file   Other Topics Concern    Not on file   Social History Narrative    Not on file         Review of Systems       Patient indicated positive for joint and back pain  Otherwise negative except per above and HPI  Physical Exam    Vitals  Vitals:    07/29/20 1456   BP: 132/70   Temp: 99 8 °F (37 7 °C)       BMI  Body mass index is 31 19 kg/m²  GENERAL: No acute distress  Alert and oriented  Well nourished and well hydrated  Appears stated age  HEENT : Normocephalic, atraumatic  Extraocular movements intact  Mask in place  NECK: Supple, trachea midline  LUNGS: Adequate and symmetric respiratory effort  No intercostal retractions or accessory muscle use  HEART: Extremities warm and perfused  ABDOMEN: Nondistended  SKIN: Warm and dry, no rash  Right Knee   Inspection/Appearance:       Swelling: No      Patella is midline  Alignment:  Knee is in mild varus  Palpation - Soft Tissue: normal without effusion  ROM:       Extension - 0          Flexion - 100 (arom), 120 (prom, pain)      extensor lag: no    Stability:  demonstrates no varus, valgus, anterior drawer or posterior drawer     Patella: stable, tracks normally  Imaging  No new imaging  Assessment and Plan  Right Knee Arthritis    Large joint arthrocentesis: R knee  Date/Time: 7/29/2020 3:12 PM  Consent given by: patient  Site marked: site marked  Timeout: Immediately prior to procedure a time out was called to verify the correct patient, procedure, equipment, support staff and site/side marked as required   Supporting Documentation  Indications: pain   Procedure Details  Location: knee - R knee  Needle size: 20 G  Approach: anterior  Medications administered: 20 mg Sodium Hyaluronate 20 MG/2ML          Discussed with patient that based on imaging and exam he certainly has R knee arthritis  He may also have a meniscal tear, which would not be suprising based on arthritis and age group  Absent mechanical locking, I would not be enthusiastic regarding arthroscopic partial menisectomy if he were found to have a meniscal tear and I did explain this to the patient  Given that, I do not think that MRI is indicated at this point  Patient in agreement  Plan to have patient return in about 3 months to reassess, consider CSI at that time  If he does develop locking symptoms, would consider MRI to assess for meniscal tear  Parveen Doyle MD  Adult Reconstruction Surgery  Department of [place] [address]  3:11 PM

## 2020-08-21 ENCOUNTER — APPOINTMENT (EMERGENCY)
Dept: CT IMAGING | Facility: HOSPITAL | Age: 55
End: 2020-08-21
Payer: COMMERCIAL

## 2020-08-21 ENCOUNTER — HOSPITAL ENCOUNTER (EMERGENCY)
Facility: HOSPITAL | Age: 55
Discharge: HOME | End: 2020-08-21
Attending: EMERGENCY MEDICINE | Admitting: EMERGENCY MEDICINE
Payer: COMMERCIAL

## 2020-08-21 VITALS
HEART RATE: 54 BPM | DIASTOLIC BLOOD PRESSURE: 94 MMHG | OXYGEN SATURATION: 94 % | TEMPERATURE: 97.5 F | RESPIRATION RATE: 22 BRPM | SYSTOLIC BLOOD PRESSURE: 141 MMHG | BODY MASS INDEX: 31.35 KG/M2 | WEIGHT: 231.48 LBS | HEIGHT: 72 IN

## 2020-08-21 DIAGNOSIS — N20.0 KIDNEY STONE ON LEFT SIDE: Primary | ICD-10-CM

## 2020-08-21 LAB
ALBUMIN SERPL BCP-MCNC: 3.6 G/DL (ref 3.5–5)
ALP SERPL-CCNC: 74 U/L (ref 46–116)
ALT SERPL W P-5'-P-CCNC: 85 U/L (ref 12–78)
ANION GAP SERPL CALCULATED.3IONS-SCNC: 11 MMOL/L (ref 4–13)
AST SERPL W P-5'-P-CCNC: 44 U/L (ref 5–45)
BASOPHILS # BLD AUTO: 0.07 THOUSANDS/ΜL (ref 0–0.1)
BASOPHILS NFR BLD AUTO: 1 % (ref 0–1)
BILIRUB SERPL-MCNC: 0.3 MG/DL (ref 0.2–1)
BUN SERPL-MCNC: 15 MG/DL (ref 5–25)
CALCIUM SERPL-MCNC: 9 MG/DL (ref 8.3–10.1)
CHLORIDE SERPL-SCNC: 103 MMOL/L (ref 100–108)
CLARITY, POC: ABNORMAL
CO2 SERPL-SCNC: 25 MMOL/L (ref 21–32)
COLOR, POC: YELLOW
CREAT SERPL-MCNC: 1.35 MG/DL (ref 0.6–1.3)
EOSINOPHIL # BLD AUTO: 0 THOUSAND/ΜL (ref 0–0.61)
EOSINOPHIL NFR BLD AUTO: 0 % (ref 0–6)
ERYTHROCYTE [DISTWIDTH] IN BLOOD BY AUTOMATED COUNT: 11.9 % (ref 11.6–15.1)
EXT BILIRUBIN, UA: NEGATIVE
EXT BLOOD URINE: ABNORMAL
EXT GLUCOSE, UA: NEGATIVE
EXT KETONES: NEGATIVE
EXT NITRITE, UA: NEGATIVE
EXT PH, UA: 7
EXT PROTEIN, UA: NEGATIVE
EXT SPECIFIC GRAVITY, UA: 1.01
EXT UROBILINOGEN: 0.2
GFR SERPL CREATININE-BSD FRML MDRD: 59 ML/MIN/1.73SQ M
GLUCOSE SERPL-MCNC: 130 MG/DL (ref 65–140)
HCT VFR BLD AUTO: 44.3 % (ref 36.5–49.3)
HGB BLD-MCNC: 15.5 G/DL (ref 12–17)
IMM GRANULOCYTES # BLD AUTO: 0.02 THOUSAND/UL (ref 0–0.2)
IMM GRANULOCYTES NFR BLD AUTO: 0 % (ref 0–2)
LIPASE SERPL-CCNC: 119 U/L (ref 73–393)
LYMPHOCYTES # BLD AUTO: 4.61 THOUSANDS/ΜL (ref 0.6–4.47)
LYMPHOCYTES NFR BLD AUTO: 41 % (ref 14–44)
MCH RBC QN AUTO: 31.1 PG (ref 26.8–34.3)
MCHC RBC AUTO-ENTMCNC: 35 G/DL (ref 31.4–37.4)
MCV RBC AUTO: 89 FL (ref 82–98)
MONOCYTES # BLD AUTO: 1.23 THOUSAND/ΜL (ref 0.17–1.22)
MONOCYTES NFR BLD AUTO: 11 % (ref 4–12)
NEUTROPHILS # BLD AUTO: 5.26 THOUSANDS/ΜL (ref 1.85–7.62)
NEUTS SEG NFR BLD AUTO: 47 % (ref 43–75)
NRBC BLD AUTO-RTO: 0 /100 WBCS
PLATELET # BLD AUTO: 287 THOUSANDS/UL (ref 149–390)
PMV BLD AUTO: 9.6 FL (ref 8.9–12.7)
POTASSIUM SERPL-SCNC: 3.6 MMOL/L (ref 3.5–5.3)
PROT SERPL-MCNC: 8.2 G/DL (ref 6.4–8.2)
RBC # BLD AUTO: 4.98 MILLION/UL (ref 3.88–5.62)
SODIUM SERPL-SCNC: 139 MMOL/L (ref 136–145)
WBC # BLD AUTO: 11.19 THOUSAND/UL (ref 4.31–10.16)
WBC # BLD EST: NEGATIVE 10*3/UL

## 2020-08-21 PROCEDURE — 36415 COLL VENOUS BLD VENIPUNCTURE: CPT

## 2020-08-21 PROCEDURE — 83690 ASSAY OF LIPASE: CPT

## 2020-08-21 PROCEDURE — 96374 THER/PROPH/DIAG INJ IV PUSH: CPT

## 2020-08-21 PROCEDURE — 99285 EMERGENCY DEPT VISIT HI MDM: CPT | Performed by: PHYSICIAN ASSISTANT

## 2020-08-21 PROCEDURE — 80053 COMPREHEN METABOLIC PANEL: CPT

## 2020-08-21 PROCEDURE — G1004 CDSM NDSC: HCPCS

## 2020-08-21 PROCEDURE — 99284 EMERGENCY DEPT VISIT MOD MDM: CPT

## 2020-08-21 PROCEDURE — 74176 CT ABD & PELVIS W/O CONTRAST: CPT

## 2020-08-21 PROCEDURE — 81002 URINALYSIS NONAUTO W/O SCOPE: CPT

## 2020-08-21 PROCEDURE — 85025 COMPLETE CBC W/AUTO DIFF WBC: CPT

## 2020-08-21 PROCEDURE — 93005 ELECTROCARDIOGRAM TRACING: CPT

## 2020-08-21 RX ORDER — KETOROLAC TROMETHAMINE 30 MG/ML
15 INJECTION, SOLUTION INTRAMUSCULAR; INTRAVENOUS ONCE
Status: COMPLETED | OUTPATIENT
Start: 2020-08-21 | End: 2020-08-21

## 2020-08-21 RX ORDER — HYDROCODONE BITARTRATE AND ACETAMINOPHEN 5; 325 MG/1; MG/1
1 TABLET ORAL EVERY 6 HOURS PRN
Qty: 15 TABLET | Refills: 0 | Status: SHIPPED | OUTPATIENT
Start: 2020-08-21

## 2020-08-21 RX ORDER — TAMSULOSIN HYDROCHLORIDE 0.4 MG/1
0.4 CAPSULE ORAL
Qty: 7 CAPSULE | Refills: 0 | Status: SHIPPED | OUTPATIENT
Start: 2020-08-21

## 2020-08-21 RX ADMIN — KETOROLAC TROMETHAMINE 15 MG: 30 INJECTION, SOLUTION INTRAMUSCULAR at 17:53

## 2020-08-21 NOTE — ED PROVIDER NOTES
History  Chief Complaint   Patient presents with    Abdominal Pain     LLQ     Patient is a 52 y/o M that presents to the ED with LLQ abdominal pain that started suddenly 1 hour ago  He describes it as a sharp pain that is constant  No radiation of pain  He denies fevers, chills, nausea, or vomiting, but does feel a little lightheaded  History provided by:  Patient  Abdominal Pain   Pain location:  LLQ  Pain quality: sharp and stabbing    Pain radiates to:  Does not radiate  Pain severity:  Moderate  Onset quality:  Sudden  Duration:  1 hour  Timing:  Constant  Progression:  Unchanged  Chronicity:  New  Context: not sick contacts, not suspicious food intake and not trauma    Relieved by:  Nothing  Worsened by:  Nothing  Ineffective treatments:  None tried  Associated symptoms: no chest pain, no chills, no constipation, no cough, no diarrhea, no dysuria, no fever, no nausea and no vomiting    Risk factors: not elderly and no recent hospitalization        Prior to Admission Medications   Prescriptions Last Dose Informant Patient Reported? Taking?   multivitamin (THERAGRAN) TABS  Self Yes No   Sig: Take 1 tablet by mouth daily      Facility-Administered Medications: None       Past Medical History:   Diagnosis Date    Abscess     resolved 1/29/2016    Keratotic lesion     last assessed 5/2/2015    Localized adiposity     last assessed 10/20/2012    Patellofemoral syndrome of right knee     last assessed 4/28/2014    Skin growth     last assessed 4/21/2015    Squamous cell carcinoma of skin     Synovial cyst of popliteal space     last assessed 4/28/2014       Past Surgical History:   Procedure Laterality Date    APPENDECTOMY  02/08/2010    Teo Dozier MD       Family History   Problem Relation Age of Onset    No Known Problems Mother     No Known Problems Father      I have reviewed and agree with the history as documented      E-Cigarette/Vaping     E-Cigarette/Vaping Substances     Social History     Tobacco Use    Smoking status: Never Smoker    Smokeless tobacco: Never Used   Substance Use Topics    Alcohol use: Not Currently     Frequency: Never    Drug use: Never       Review of Systems   Constitutional: Negative for chills and fever  HENT: Negative  Respiratory: Negative for cough  Cardiovascular: Negative for chest pain  Gastrointestinal: Positive for abdominal pain  Negative for blood in stool, constipation, diarrhea, nausea and vomiting  Genitourinary: Negative for dysuria, frequency and urgency  Musculoskeletal: Negative for back pain and neck pain  Skin: Negative for color change and rash  Neurological: Negative for dizziness, weakness and numbness  Psychiatric/Behavioral: Negative for confusion  All other systems reviewed and are negative  Physical Exam  Physical Exam  Vitals signs and nursing note reviewed  Constitutional:       General: He is in acute distress  Appearance: Normal appearance  He is well-developed and well-groomed  He is obese  He is not diaphoretic  Eyes:      General: Lids are normal       Conjunctiva/sclera: Conjunctivae normal    Neck:      Musculoskeletal: Normal range of motion  Cardiovascular:      Rate and Rhythm: Regular rhythm  Bradycardia present  Heart sounds: Normal heart sounds  Pulmonary:      Effort: Pulmonary effort is normal       Breath sounds: Normal breath sounds  No wheezing, rhonchi or rales  Abdominal:      General: Abdomen is protuberant  Bowel sounds are normal       Palpations: Abdomen is soft  Tenderness: There is no abdominal tenderness  There is no right CVA tenderness or left CVA tenderness  Musculoskeletal: Normal range of motion  Right lower leg: No edema  Left lower leg: No edema  Skin:     General: Skin is warm and dry  Findings: No rash  Neurological:      Mental Status: He is alert and oriented to person, place, and time     Psychiatric:         Mood and Affect: Mood normal          Behavior: Behavior is cooperative           Vital Signs  ED Triage Vitals   Temperature Pulse Respirations Blood Pressure SpO2   08/21/20 1742 08/21/20 1742 08/21/20 1742 08/21/20 1744 08/21/20 1742   97 5 °F (36 4 °C) (!) 52 22 157/87 97 %      Temp Source Heart Rate Source Patient Position - Orthostatic VS BP Location FiO2 (%)   08/21/20 1742 08/21/20 1742 08/21/20 1744 08/21/20 1744 --   Temporal Monitor Lying Right arm       Pain Score       08/21/20 1744       Worst Possible Pain           Vitals:    08/21/20 1830 08/21/20 1845 08/21/20 1900 08/21/20 1915   BP: 148/85 150/81 138/88 141/94   Pulse:       Patient Position - Orthostatic VS:             Visual Acuity      ED Medications  Medications   ketorolac (TORADOL) injection 15 mg (15 mg Intravenous Given 8/21/20 1753)       Diagnostic Studies  Results Reviewed     Procedure Component Value Units Date/Time    POCT urinalysis dipstick [[id number]]  (Abnormal) Resulted:  08/21/20 1947    Lab Status:  Final result Specimen:  Urine, Other Updated:  08/21/20 1948     Color, UA YELLOW     Clarity, UA HAZY     Glucose, UA (Ref: Negative) NEGATIVE     Bilirubin, UA (Ref: Negative) NEGATIVE     Ketones, UA (Ref: Negative) NEGATIVE     Spec Grav, UA (Ref:1 003-1 030) 1 015     Blood, UA (Ref: Negative) SMALL     pH, UA (Ref: 4 5-8 0) 7 0     Protein, UA (Ref: Negative) NEGATIVE     Urobilinogen, UA (Ref: 0 2- 1 0) 0 2      Leukocytes, UA (Ref: Negative) NEGATIVE     Nitrite, UA (Ref: Negative) NEGATIVE    Comprehensive metabolic panel [[id number]]  (Abnormal) Collected:  08/21/20 1746    Lab Status:  Final result Specimen:  Blood from Arm, Right Updated:  08/21/20 1811     Sodium 139 mmol/L      Potassium 3 6 mmol/L      Chloride 103 mmol/L      CO2 25 mmol/L      ANION GAP 11 mmol/L      BUN 15 mg/dL      Creatinine 1 35 mg/dL      Glucose 130 mg/dL      Calcium 9 0 mg/dL      AST 44 U/L      ALT 85 U/L      Alkaline Phosphatase 74 U/L      Total Protein 8 2 g/dL      Albumin 3 6 g/dL      Total Bilirubin 0 30 mg/dL      eGFR 59 ml/min/1 73sq m     Narrative:       [place] guidelines for Chronic Kidney Disease (CKD):     Stage 1 with normal or high GFR (GFR > 90 mL/min/1 73 square meters)    Stage 2 Mild CKD (GFR = 60-89 mL/min/1 73 square meters)    Stage 3A Moderate CKD (GFR = 45-59 mL/min/1 73 square meters)    Stage 3B Moderate CKD (GFR = 30-44 mL/min/1 73 square meters)    Stage 4 Severe CKD (GFR = 15-29 mL/min/1 73 square meters)    Stage 5 End Stage CKD (GFR <15 mL/min/1 73 square meters)  Note: GFR calculation is accurate only with a steady state creatinine    Lipase [[id number]]  (Normal) Collected:  08/21/20 1746    Lab Status:  Final result Specimen:  Blood from Arm, Right Updated:  08/21/20 1811     Lipase 119 u/L     CBC and differential [[id number]]  (Abnormal) Collected:  08/21/20 1746    Lab Status:  Final result Specimen:  Blood from Arm, Right Updated:  08/21/20 1755     WBC 11 19 Thousand/uL      RBC 4 98 Million/uL      Hemoglobin 15 5 g/dL      Hematocrit 44 3 %      MCV 89 fL      MCH 31 1 pg      MCHC 35 0 g/dL      RDW 11 9 %      MPV 9 6 fL      Platelets 810 Thousands/uL      nRBC 0 /100 WBCs      Neutrophils Relative 47 %      Immat GRANS % 0 %      Lymphocytes Relative 41 %      Monocytes Relative 11 %      Eosinophils Relative 0 %      Basophils Relative 1 %      Neutrophils Absolute 5 26 Thousands/µL      Immature Grans Absolute 0 02 Thousand/uL      Lymphocytes Absolute 4 61 Thousands/µL      Monocytes Absolute 1 23 Thousand/µL      Eosinophils Absolute 0 00 Thousand/µL      Basophils Absolute 0 07 Thousands/µL                  CT renal stone study abdomen pelvis without contrast   Final Result by Brian Shannon MD (08/21 1917)      0 4 cm partially obstructing calculus at the left distal ureter resulting in minimal hydroureter and periureteral inflammatory stranding  Hepatic steatosis  Workstation performed: [id number]                    Procedures  ECG 12 Lead Documentation Only    Date/Time: 8/21/2020 5:58 PM  Performed by: Sophie Rosen PA-C  Authorized by: Sophie Rosen PA-C     Indications / Diagnosis:  Bradycardia  Patient location:  ED  Previous ECG:     Previous ECG:  Unavailable  Rate:     ECG rate:  49    ECG rate assessment: bradycardic    Rhythm:     Rhythm: sinus bradycardia    ST segments:     ST segments:  Normal  T waves:     T waves: normal               ED Course       US AUDIT      Most Recent Value   Initial Alcohol Screen: US AUDIT-C    1  How often do you have a drink containing alcohol?  0 Filed at: 08/21/2020 1739   2  How many drinks containing alcohol do you have on a typical day you are drinking? 0 Filed at: 08/21/2020 1739   3a  Male UNDER 65: How often do you have five or more drinks on one occasion? 0 Filed at: 08/21/2020 1739   Audit-C Score  0 Filed at: 08/21/2020 1739                  JASMIN/DAST-10      Most Recent Value   How many times in the past year have you    Used an illegal drug or used a prescription medication for non-medical reasons? Never Filed at: 08/21/2020 1742                                MDM  Number of Diagnoses or Management Options  Kidney stone on left side: new and requires workup  Diagnosis management comments: Patient with LLQ abdominal pain, started suddenly, will order CT scan to r/o Kidney stone          Amount and/or Complexity of Data Reviewed  Clinical lab tests: ordered and reviewed  Tests in the radiology section of CPT®: ordered and reviewed    Patient Progress  Patient progress: stable        Disposition  Final diagnoses:   Kidney stone on left side     Time reflects when diagnosis was documented in both MDM as applicable and the Disposition within this note     Time User Action Codes Description Comment    8/21/2020  7:59 PM Shannon Ortiz Add [N20 0] Kidney stone on left side       ED Disposition     ED Disposition Condition Date/Time Comment    Discharge Stable Fri Aug 21, 2020  7:59 PM Ashley Bey discharge to home/self care  Follow-up Information     Follow up With Specialties Details Why Contact Info    Lowell Wellington MD Urology Call in 1 day For onelia Dodge  [facility]  Manual [place] [address]  [phone]            Discharge Medication List as of 8/21/2020  8:02 PM      START taking these medications    Details   HYDROcodone-acetaminophen (NORCO) 5-325 mg per tablet Take 1 tablet by mouth every 6 (six) hours as needed for painMax Daily Amount: 4 tablets, Starting Fri 8/21/2020, Normal      tamsulosin (FLOMAX) 0 4 mg Take 1 capsule (0 4 mg total) by mouth daily with dinner, Starting Fri 8/21/2020, Normal         CONTINUE these medications which have NOT CHANGED    Details   multivitamin (THERAGRAN) TABS Take 1 tablet by mouth daily, Historical Med           No discharge procedures on file      PDMP Review     None          ED Provider  Electronically Signed by           Sergio Dixon PA-C  08/21/20 2017

## 2020-08-21 NOTE — ED TRIAGE NOTES
Pt arrived th ED in private vehicle with wife  Pt reports nonradiating 10/10LLQ pain since 4:20pm  Pt sts that he has mowed his lawn, and after finishing the pain began  Pt reports feeling lightheaded on assessment   LBM today

## 2020-08-22 LAB
ATRIAL RATE: 49 BPM
P AXIS: 23 DEGREES
PR INTERVAL: 158 MS
QRS AXIS: 0 DEGREES
QRSD INTERVAL: 96 MS
QT INTERVAL: 462 MS
QTC INTERVAL: 417 MS
T WAVE AXIS: 13 DEGREES
VENTRICULAR RATE: 49 BPM

## 2020-08-22 PROCEDURE — 93010 ELECTROCARDIOGRAM REPORT: CPT | Performed by: INTERNAL MEDICINE

## 2020-08-22 NOTE — DISCHARGE INSTRUCTIONS
Rest, increase fluids  Take flomax daily with dinner for 7 days  Take motrin 600mg every 6 hours as needed for pain  Take vicodin for severe pain  Strain urine to collect stone and follow up with urologist   Return to ER if fevers, pain worsens, vomiting

## 2020-08-26 ENCOUNTER — VBI (OUTPATIENT)
Dept: FAMILY MEDICINE CLINIC | Facility: CLINIC | Age: 55
End: 2020-08-26

## 2020-08-26 NOTE — TELEPHONE ENCOUNTER
Selma Gaffneyemre    ED Visit Information     Ed visit date: 8/21/2020  Diagnosis Description:  Kidney stone on left side       In Network? Yes [address]  Discharge status: Home  Discharged with meds ? Yes  Number of ED visits to date: 1  ED Severity:N/a     Outreach Information    Outreach successful: Yes 1  Date letter mailed:N/a  Date Finalized:8/26/2020    Care Coordination    Follow up appointment with pcp: no Declined  Transportation issues ? No    Value [employer] type:  7 Day Outreach  Emergent necessity warranted by diagnosis:  No  [facility] PCP:  Yes  Transportation:  Friend/Family Transport  Called PCP first?:  No  Feels able to call PCP for urgent problems ?:  Yes  Understands what emergencies can be handled by PCP ?:  Yes  Ever any problems getting appointment with PCP for minor emergency/urgency problems?:  No  Practice Contacted Patient ?:  No  Pt had ED follow up with pcp/staff ?:  No    Seen for follow-up out of network ?:  No  Reason Patient went to ED instead of Urgent Care or PCP?:  Perceived Severity of Illness  Urgent care Education?:  Yes  08/26/2020 01:33 PM Phone (Sidra Gilmore) John Briggs (Self) [phone] (H)   Call Complete  Personal communication with patient regarding recent ED visit on 8/21/2020 for Kidney stone on left side  Patient was discharged with medication and advised to follow up with urology  Patient stated that he will follow up but not yet due to being busy with work  Patient also stated that he passed the stone on Sunday (8/23/2020)  He had saved it to take to follow up  Patient does not meet OPCM criteria  Patient is aware of his nearest urgent care facility

## 2020-10-29 ENCOUNTER — OFFICE VISIT (OUTPATIENT)
Dept: OBGYN CLINIC | Facility: CLINIC | Age: 55
End: 2020-10-29
Payer: COMMERCIAL

## 2020-10-29 VITALS
DIASTOLIC BLOOD PRESSURE: 80 MMHG | WEIGHT: 231 LBS | BODY MASS INDEX: 31.29 KG/M2 | SYSTOLIC BLOOD PRESSURE: 145 MMHG | TEMPERATURE: 98.3 F | HEIGHT: 72 IN

## 2020-10-29 DIAGNOSIS — M17.10 ARTHRITIS OF KNEE: Primary | ICD-10-CM

## 2020-10-29 PROCEDURE — 99212 OFFICE O/P EST SF 10 MIN: CPT | Performed by: ORTHOPAEDIC SURGERY

## 2020-10-29 PROCEDURE — 3008F BODY MASS INDEX DOCD: CPT | Performed by: ORTHOPAEDIC SURGERY

## 2021-01-28 ENCOUNTER — VBI (OUTPATIENT)
Dept: ADMINISTRATIVE | Facility: OTHER | Age: 56
End: 2021-01-28

## 2021-04-29 ENCOUNTER — TELEPHONE (OUTPATIENT)
Dept: FAMILY MEDICINE CLINIC | Facility: CLINIC | Age: 56
End: 2021-04-29

## 2021-04-29 NOTE — TELEPHONE ENCOUNTER
Spoke to patient refused the recommendation of a Cologuard or a Colonoscopy and also an Annual Physical   trb

## 2021-09-09 ENCOUNTER — NURSE TRIAGE (OUTPATIENT)
Dept: OTHER | Facility: OTHER | Age: 56
End: 2021-09-09

## 2021-09-09 NOTE — TELEPHONE ENCOUNTER
Appointment scheduled for 9/14  Reason for Disposition   [1] Skin growth or mole AND [2] changes color, or it has more than one color    Answer Assessment - Initial Assessment Questions  1  APPEARANCE of LESION: "What does it look like?"       "It is black in the center with a red ring around it "   2  SIZE: "How big is it?" (e g , compare to size of pinhead, tip of pen, eraser, coin, pea, grape, ping pong ball; or size in cms or inches)       "Little bit bigger than a pencil eraser  Maybe a 1/4 inch across "   3  COLOR: "What color is it?" "Is there more than one color?"       "It is black in the center with a red ring around it "   4  SHAPE: "What shape is it?" (e g , round, irregular)      "It is basically round but it might be irregular "   5  RAISED: "Does it stick up above the skin or is it flat?" (e g , raised or elevated)      "A little bit "   6  TENDER: "Does it hurt when you touch it?"  (Scale 1-10; or mild, moderate, severe)      "If I put my fingernail on it and pick at it it is "   7  LOCATION: "Where is it located?"       Right upper thigh   8  ONSET: "When did it first appear?"       Yesterday   9  NUMBER: "Is there just one?" or "Are there others?"      One   10  CAUSE: "What do you think it is?"      Unknown, "I had melanoma in the past "   11   OTHER SYMPTOMS: "Do you have any other symptoms?" (e g , fever)       Denies    Protocols used: SKIN LESION - MOLES OR GROWTHS-ADULT-

## 2021-09-14 ENCOUNTER — OFFICE VISIT (OUTPATIENT)
Dept: FAMILY MEDICINE CLINIC | Facility: CLINIC | Age: 56
End: 2021-09-14
Payer: COMMERCIAL

## 2021-09-14 VITALS
HEIGHT: 72 IN | SYSTOLIC BLOOD PRESSURE: 186 MMHG | TEMPERATURE: 99.3 F | BODY MASS INDEX: 30.53 KG/M2 | HEART RATE: 68 BPM | OXYGEN SATURATION: 97 % | WEIGHT: 225.4 LBS | DIASTOLIC BLOOD PRESSURE: 94 MMHG | RESPIRATION RATE: 18 BRPM

## 2021-09-14 DIAGNOSIS — D49.2 SKIN NEOPLASM: ICD-10-CM

## 2021-09-14 DIAGNOSIS — I10 ESSENTIAL HYPERTENSION: Primary | ICD-10-CM

## 2021-09-14 PROCEDURE — 1036F TOBACCO NON-USER: CPT | Performed by: FAMILY MEDICINE

## 2021-09-14 PROCEDURE — 99213 OFFICE O/P EST LOW 20 MIN: CPT | Performed by: FAMILY MEDICINE

## 2021-09-14 PROCEDURE — 3008F BODY MASS INDEX DOCD: CPT | Performed by: FAMILY MEDICINE

## 2021-09-14 PROCEDURE — 3725F SCREEN DEPRESSION PERFORMED: CPT | Performed by: FAMILY MEDICINE

## 2021-09-14 NOTE — PATIENT INSTRUCTIONS
Obesity   AMBULATORY CARE:   Obesity  is when your body mass index (BMI) is greater than 30  Your healthcare provider will use your height and weight to measure your BMI  The risks of obesity include  many health problems, such as injuries or physical disability  You may need tests to check for the following:  · Diabetes    · High blood pressure or high cholesterol    · Heart disease    · Gallbladder or liver disease    · Cancer of the colon, breast, prostate, liver, or kidney    · Sleep apnea    · Arthritis or gout    Seek care immediately if:   · You have a severe headache, confusion, or difficulty speaking  · You have weakness on one side of your body  · You have chest pain, sweating, or shortness of breath  Contact your healthcare provider if:   · You have symptoms of gallbladder or liver disease, such as pain in your upper abdomen  · You have knee or hip pain and discomfort while walking  · You have symptoms of diabetes, such as intense hunger and thirst, and frequent urination  · You have symptoms of sleep apnea, such as snoring or daytime sleepiness  · You have questions or concerns about your condition or care  Treatment for obesity  focuses on helping you lose weight to improve your health  Even a small decrease in BMI can reduce the risk for many health problems  Your healthcare provider will help you set a weight-loss goal   · Lifestyle changes  are the first step in treating obesity  These include making healthy food choices and getting regular physical activity  Your healthcare provider may suggest a weight-loss program that involves coaching, education, and therapy  · Medicine  may help you lose weight when it is used with a healthy diet and physical activity  · Surgery  can help you lose weight if you are very obese and have other health problems  There are several types of weight-loss surgery  Ask your healthcare provider for more information      Be successful losing weight:   · Set small, realistic goals  An example of a small goal is to walk for 20 minutes 5 days a week  Anther goal is to lose 5% of your body weight  · Tell friends, family members, and coworkers about your goals  and ask for their support  Ask a friend to lose weight with you, or join a weight-loss support group  · Identify foods or triggers that may cause you to overeat , and find ways to avoid them  Remove tempting high-calorie foods from your home and workplace  Place a bowl of fresh fruit on your kitchen counter  If stress causes you to eat, then find other ways to cope with stress  · Keep a diary to track what you eat and drink  Also write down how many minutes of physical activity you do each day  Weigh yourself once a week and record it in your diary  Eating changes: You will need to eat 500 to 1,000 fewer calories each day than you currently eat to lose 1 to 2 pounds a week  The following changes will help you cut calories:  · Eat smaller portions  Use small plates, no larger than 9 inches in diameter  Fill your plate half full of fruits and vegetables  Measure your food using measuring cups until you know what a serving size looks like  · Eat 3 meals and 1 or 2 snacks each day  Plan your meals in advance  Ayana Elder and eat at home most of the time  Eat slowly  Do not skip meals  Skipping meals can lead to overeating later in the day  This can make it harder for you to lose weight  Talk with a dietitian to help you make a meal plan and schedule that is right for you  · Eat fruits and vegetables at every meal   They are low in calories and high in fiber, which makes you feel full  Do not add butter, margarine, or cream sauce to vegetables  Use herbs to season steamed vegetables  · Eat less fat and fewer fried foods  Eat more baked or grilled chicken and fish  These protein sources are lower in calories and fat than red meat  Limit fast food   Dress your salads with olive oil and vinegar instead of bottled dressing  · Limit the amount of sugar you eat  Do not drink sugary beverages  Limit alcohol  Activity changes:  Physical activity is good for your body in many ways  It helps you burn calories and build strong muscles  It decreases stress and depression, and improves your mood  It can also help you sleep better  Talk to your healthcare provider before you begin an exercise program   · Exercise for at least 30 minutes 5 days a week  Start slowly  Set aside time each day for physical activity that you enjoy and that is convenient for you  It is best to do both weight training and an activity that increases your heart rate, such as walking, bicycling, or swimming  · Find ways to be more active  Do yard work and housecleaning  Walk up the stairs instead of using elevators  Spend your leisure time going to events that require walking, such as outdoor festivals or fairs  This extra physical activity can help you lose weight and keep it off  Follow up with your healthcare provider as directed: You may need to meet with a dietitian  Write down your questions so you remember to ask them during your visits  © Copyright [address] 2021 Information is for End User's use only and may not be sold, redistributed or otherwise used for commercial purposes  All illustrations and images included in CareNotes® are the copyrighted property of A D A M , Inc  or [facility] Pedro Pablo Lyon   The above information is an  only  It is not intended as medical advice for individual conditions or treatments  Talk to your doctor, nurse or pharmacist before following any medical regimen to see if it is safe and effective for you  Weight Management   AMBULATORY CARE:   Why it is important to manage your weight:  Being overweight increases your risk of health conditions such as heart disease, high blood pressure, type 2 diabetes, and certain types of cancer   It can also increase your risk for osteoarthritis, sleep apnea, and other respiratory problems  Aim for a slow, steady weight loss  Even a small amount of weight loss can lower your risk of health problems  How to lose weight safely:  A safe and healthy way to lose weight is to eat fewer calories and get regular exercise  · You can lose up about 1 pound a week by decreasing the number of calories you eat by 500 calories each day  You can decrease calories by eating smaller portion sizes or by cutting out high-calorie foods  Read labels to find out how many calories are in the foods you eat  · You can also burn calories with exercise such as walking, swimming, or biking  You will be more likely to keep weight off if you make these changes part of your lifestyle  Exercise at least 30 minutes per day on most days of the week  You can also fit in more physical activity by taking the stairs instead of the elevator or parking farther away from stores  Ask your healthcare provider about the best exercise plan for you  Healthy meal plan for weight management:  A healthy meal plan includes a variety of foods, contains fewer calories, and helps you stay healthy  A healthy meal plan includes the following:     · Eat whole-grain foods more often  A healthy meal plan should contain fiber  Fiber is the part of grains, fruits, and vegetables that is not broken down by your body  Whole-grain foods are healthy and provide extra fiber in your diet  Some examples of whole-grain foods are whole-wheat breads and pastas, oatmeal, brown rice, and bulgur  · Eat a variety of vegetables every day  Include dark, leafy greens such as spinach, kale, nakia greens, and mustard greens  Eat yellow and orange vegetables such as carrots, sweet potatoes, and winter squash  · Eat a variety of fruits every day  Choose fresh or canned fruit (canned in its own juice or light syrup) instead of juice  Fruit juice has very little or no fiber      · Eat low-fat dairy foods  Drink fat-free (skim) milk or 1% milk  Eat fat-free yogurt and low-fat cottage cheese  Try low-fat cheeses such as mozzarella and other reduced-fat cheeses  · Choose meat and other protein foods that are low in fat  Choose beans or other legumes such as split peas or lentils  Choose fish, skinless poultry (chicken or turkey), or lean cuts of red meat (beef or pork)  Before you cook meat or poultry, cut off any visible fat  · Use less fat and oil  Try baking foods instead of frying them  Add less fat, such as margarine, sour cream, regular salad dressing and mayonnaise to foods  Eat fewer high-fat foods  Some examples of high-fat foods include french fries, doughnuts, ice cream, and cakes  · Eat fewer sweets  Limit foods and drinks that are high in sugar  This includes candy, cookies, regular soda, and sweetened drinks  Ways to decrease calories:   · Eat smaller portions  ? Use a small plate with smaller servings  ? Do not eat second helpings  ? When you eat at a restaurant, ask for a box and place half of your meal in the box before you eat  ? Share an entrée with someone else  · Replace high-calorie snacks with healthy, low-calorie snacks  ? Choose fresh fruit, vegetables, fat-free rice cakes, or air-popped popcorn instead of potato chips, nuts, or chocolate  ? Choose water or calorie-free drinks instead of soda or sweetened drinks  · Do not shop for groceries when you are hungry  You may be more likely to make unhealthy food choices  Take a grocery list of healthy foods and shop after you have eaten  · Eat regular meals  Do not skip meals  Skipping meals can lead to overeating later in the day  This can make it harder for you to lose weight  Eat a healthy snack in place of a meal if you do not have time to eat a regular meal  Talk with a dietitian to help you create a meal plan and schedule that is right for you      Other things to consider as you try to lose weight:   · Be aware of situations that may give you the urge to overeat, such as eating while watching television  Find ways to avoid these situations  For example, read a book, go for a walk, or do crafts  · Meet with a weight loss support group or friends who are also trying to lose weight  This may help you stay motivated to continue working on your weight loss goals  © Copyright [employer] 2021 Information is for End User's use only and may not be sold, redistributed or otherwise used for commercial purposes  All illustrations and images included in CareNotes® are the copyrighted property of A D A M , Inc  or [facility] [employer] Pablo Lyon   The above information is an  only  It is not intended as medical advice for individual conditions or treatments  Talk to your doctor, nurse or pharmacist before following any medical regimen to see if it is safe and effective for you

## 2021-09-14 NOTE — PROGRESS NOTES
Assessment/Plan:     Diagnoses and all orders for this visit:    Essential hypertension    Skin neoplasm        Patient's blood pressure is very high today   There is none of the determine if this is more awake her hypertension and there is an issue I suspect the patient has developed hypertension  The patient had wishes to get a recheck  Will follow up in 6 weeks for blood pressure check   The lesion on his leg is also unclear due to the fact he was picking at it   We will allow this to heal and again in 6 weeks recheck      Subjective:     Chief Complaint   Patient presents with    Skin lesion     Patient has a new skin lesion on his fright upper thigh he'd like examined - personal history of melanoma  Patient ID: Isabel Schwab is a 63 y o  male  Patient presents today for a lesion on his leg   He states his history of skin cancer in the past never followed up with dermatology   He says he has picked at it looks abnormal at this point   The only other issues today is his blood pressure is very high      The following portions of the patient's history were reviewed and updated as appropriate: allergies, current medications, past family history, past medical history, past social history, past surgical history and problem list     Review of Systems   Constitutional: Negative  HENT: Negative  Eyes: Negative  Respiratory: Negative  Cardiovascular: Negative  Gastrointestinal: Negative  Endocrine: Negative  Genitourinary: Negative  Musculoskeletal: Negative  Skin: Negative  Allergic/Immunologic: Negative  Neurological: Negative  Hematological: Negative  Psychiatric/Behavioral: Negative  All other systems reviewed and are negative          Objective:    Vitals:    09/14/21 1548   BP: (!) 186/94   BP Location: Right arm   Patient Position: Sitting   Cuff Size: Standard   Pulse: 68   Resp: 18   Temp: 99 3 °F (37 4 °C)   TempSrc: Tympanic   SpO2: 97%   Weight: 102 kg (225 lb 6 4 oz)   Height: 6' (1 829 m)          Physical Exam  Vitals and nursing note reviewed  Constitutional:       General: He is not in acute distress  Appearance: He is well-developed  HENT:      Head: Normocephalic  Right Ear: External ear normal       Left Ear: External ear normal       Nose: Nose normal    Eyes:      General:         Right eye: No discharge  Left eye: No discharge  Conjunctiva/sclera: Conjunctivae normal       Pupils: Pupils are equal, round, and reactive to light  Cardiovascular:      Rate and Rhythm: Normal rate and regular rhythm  Heart sounds: Normal heart sounds  Pulmonary:      Effort: Pulmonary effort is normal       Breath sounds: Normal breath sounds  Abdominal:      General: Bowel sounds are normal  There is no distension  Palpations: Abdomen is soft  Tenderness: There is no abdominal tenderness  Musculoskeletal:         General: Normal range of motion  Cervical back: Normal range of motion  Skin:     General: Skin is warm and dry  Findings: No rash  Neurological:      Mental Status: He is alert and oriented to person, place, and time  Cranial Nerves: No cranial nerve deficit  Psychiatric:         Behavior: Behavior normal          Thought Content: Thought content normal          Judgment: Judgment normal          BMI Counseling: Body mass index is 30 57 kg/m²  The BMI is above normal  Nutrition recommendations include reducing portion sizes, decreasing overall calorie intake, 3-5 servings of fruits/vegetables daily, reducing fast food intake, consuming healthier snacks, decreasing soda and/or juice intake, moderation in carbohydrate intake, increasing intake of lean protein, reducing intake of saturated fat and trans fat and reducing intake of cholesterol  Exercise recommendations include exercising 3-5 times per week

## 2021-09-27 ENCOUNTER — TELEPHONE (OUTPATIENT)
Dept: FAMILY MEDICINE CLINIC | Facility: CLINIC | Age: 56
End: 2021-09-27

## 2022-03-01 NOTE — TELEPHONE ENCOUNTER
Patients wife called me asking about steroid injections for  to try since insurance denied the gel injection due to failure of conservative treatment   He will call back and schedule an appt with Dr Deejay Markham for Island Pedicle Flap With Canthal Suspension Text: The defect edges were debeveled with a #15 scalpel blade.  Given the location of the defect, shape of the defect and the proximity to free margins an island pedicle advancement flap was deemed most appropriate.  Using a sterile surgical marker, an appropriate advancement flap was drawn incorporating the defect, outlining the appropriate donor tissue and placing the expected incisions within the relaxed skin tension lines where possible. The area thus outlined was incised deep to adipose tissue with a #15 scalpel blade.  The skin margins were undermined to an appropriate distance in all directions around the primary defect and laterally outward around the island pedicle utilizing iris scissors.  There was minimal undermining beneath the pedicle flap. A suspension suture was placed in the canthal tendon to prevent tension and prevent ectropion.

## 2022-08-17 ENCOUNTER — OFFICE VISIT (OUTPATIENT)
Dept: FAMILY MEDICINE CLINIC | Facility: CLINIC | Age: 57
End: 2022-08-17
Payer: COMMERCIAL

## 2022-08-17 VITALS
RESPIRATION RATE: 18 BRPM | BODY MASS INDEX: 30.48 KG/M2 | TEMPERATURE: 98.5 F | HEART RATE: 89 BPM | SYSTOLIC BLOOD PRESSURE: 170 MMHG | HEIGHT: 72 IN | WEIGHT: 225 LBS | OXYGEN SATURATION: 94 % | DIASTOLIC BLOOD PRESSURE: 90 MMHG

## 2022-08-17 DIAGNOSIS — R22.2 MASS OF LEFT CHEST WALL: Primary | ICD-10-CM

## 2022-08-17 PROCEDURE — 99213 OFFICE O/P EST LOW 20 MIN: CPT | Performed by: NURSE PRACTITIONER

## 2022-08-17 NOTE — PROGRESS NOTES
Assessment/Plan   Problem List Items Addressed This Visit    None     Visit Diagnoses     Mass of left chest wall    -  Primary    Relevant Orders    US superficial lump (non extremity)                Chief Complaint   Patient presents with    Hardness in chest     Patient reports that the left side of his chest is hard - he first noticed this about 2 weeks ago  He noticed that it's not as bad as when he'd called in yesterday, but would still like it examined to be sure there's nothing to worry about  Subjective   Patient ID: Isabel Elmore is a 61 y o  male  Vitals:    08/17/22 1418   BP: 170/90   Pulse: 89   Resp: 18   Temp: 98 5 °F (36 9 °C)   SpO2: 94%     Here today to discuss left breast larger than right, reports no pain or tenderness- no nipple discharge -"just noticed one day it was larger"   Discussed test of choice would be mammogram but he is reluctant to complete this test   Will attempt US - will schedule     BP elevated - initial /108 and repeat 170/90-admits that it has been like this "for years" BP measurements at home are "140-150/70-80"   Discussed lab testing and medications butt he defers at this time  Encouraged scheduling of well visit with his PCP      The following portions of the patient's history were reviewed and updated as appropriate: allergies, current medications, past family history, past medical history, past social history and problem list     Review of Systems   Constitutional: Negative  HENT: Negative  Eyes: Negative  Respiratory: Negative  Cardiovascular: Negative  Gastrointestinal: Negative  Endocrine: Negative  Genitourinary: Negative  Musculoskeletal: Negative  Skin: Negative  Allergic/Immunologic: Negative  Neurological: Negative  Hematological: Negative  Psychiatric/Behavioral: Negative  Objective     Physical Exam  Vitals and nursing note reviewed  Constitutional:       Appearance: He is not ill-appearing  HENT:      Head: Normocephalic and atraumatic  Nose: Nose normal  No congestion  Eyes:      General:         Right eye: No discharge  Left eye: No discharge  Extraocular Movements: Extraocular movements intact  Conjunctiva/sclera: Conjunctivae normal    Cardiovascular:      Rate and Rhythm: Normal rate and regular rhythm  Pulses: Normal pulses  Heart sounds: Normal heart sounds  No murmur heard  Pulmonary:      Effort: Pulmonary effort is normal       Breath sounds: Normal breath sounds  Chest:       Musculoskeletal:         General: Normal range of motion  Cervical back: Normal range of motion  Skin:     General: Skin is warm and dry  Capillary Refill: Capillary refill takes less than 2 seconds  Neurological:      Mental Status: He is alert and oriented to person, place, and time  Psychiatric:         Mood and Affect: Mood normal          Behavior: Behavior normal          Thought Content:  Thought content normal          Judgment: Judgment normal      No Known Allergies

## 2022-08-30 ENCOUNTER — TELEPHONE (OUTPATIENT)
Dept: FAMILY MEDICINE CLINIC | Facility: CLINIC | Age: 57
End: 2022-08-30

## 2022-08-30 NOTE — TELEPHONE ENCOUNTER
Patient was told to reach out to the office regarding the US chest that you had ordered  This was scheduled then cancelled and he has been given the run around to have this test done  Please advise patient can be reached at [phone]  I'm not sure if it needs to be ordered differently or what the issue is with it

## 2022-08-31 ENCOUNTER — TELEPHONE (OUTPATIENT)
Dept: MAMMOGRAPHY | Facility: CLINIC | Age: 57
End: 2022-08-31

## 2022-08-31 DIAGNOSIS — N63.20 MASS OF LEFT BREAST, UNSPECIFIED QUADRANT: Primary | ICD-10-CM

## 2022-09-22 ENCOUNTER — TELEPHONE (OUTPATIENT)
Dept: FAMILY MEDICINE CLINIC | Facility: CLINIC | Age: 57
End: 2022-09-22

## 2022-09-22 DIAGNOSIS — N63.20 MASS OF LEFT BREAST, UNSPECIFIED QUADRANT: Primary | ICD-10-CM

## 2022-09-22 NOTE — TELEPHONE ENCOUNTER
Radiology called and said the order in Epic for the ultra sound isnt signed  Please sign   Thank you

## 2022-09-26 ENCOUNTER — HOSPITAL ENCOUNTER (OUTPATIENT)
Dept: MAMMOGRAPHY | Facility: CLINIC | Age: 57
Discharge: HOME | End: 2022-09-26
Payer: COMMERCIAL

## 2022-09-26 ENCOUNTER — HOSPITAL ENCOUNTER (OUTPATIENT)
Dept: ULTRASOUND IMAGING | Facility: CLINIC | Age: 57
Discharge: HOME | End: 2022-09-26
Payer: COMMERCIAL

## 2022-09-26 VITALS — BODY MASS INDEX: 30.46 KG/M2 | WEIGHT: 224.87 LBS | HEIGHT: 72 IN

## 2022-09-26 DIAGNOSIS — N63.20 MASS OF LEFT BREAST, UNSPECIFIED QUADRANT: ICD-10-CM

## 2022-09-26 PROCEDURE — 77066 DX MAMMO INCL CAD BI: CPT

## 2022-09-26 PROCEDURE — G0279 TOMOSYNTHESIS, MAMMO: HCPCS

## 2022-09-26 PROCEDURE — 76642 ULTRASOUND BREAST LIMITED: CPT

## 2023-01-24 ENCOUNTER — OFFICE VISIT (OUTPATIENT)
Dept: FAMILY MEDICINE CLINIC | Facility: CLINIC | Age: 58
End: 2023-01-24

## 2023-01-24 VITALS
HEART RATE: 83 BPM | DIASTOLIC BLOOD PRESSURE: 90 MMHG | BODY MASS INDEX: 31.53 KG/M2 | TEMPERATURE: 98.5 F | SYSTOLIC BLOOD PRESSURE: 170 MMHG | WEIGHT: 232.8 LBS | OXYGEN SATURATION: 96 % | HEIGHT: 72 IN | RESPIRATION RATE: 22 BRPM

## 2023-01-24 DIAGNOSIS — Z13.6 SCREENING FOR CARDIOVASCULAR CONDITION: ICD-10-CM

## 2023-01-24 DIAGNOSIS — I10 PRIMARY HYPERTENSION: ICD-10-CM

## 2023-01-24 DIAGNOSIS — Z13.1 SCREENING FOR DIABETES MELLITUS: ICD-10-CM

## 2023-01-24 DIAGNOSIS — L91.8 MULTIPLE ACQUIRED SKIN TAGS: Primary | ICD-10-CM

## 2023-01-24 RX ORDER — VALSARTAN 80 MG/1
80 TABLET ORAL DAILY
Qty: 90 TABLET | Refills: 1 | Status: SHIPPED | OUTPATIENT
Start: 2023-01-24

## 2023-01-24 NOTE — PROGRESS NOTES
Name: Annalee Gonzalez      : 1965      MRN: [id number]  Encounter Provider: THIERRY Hopson  Encounter Date: 2023   Encounter department: [facility] Karen Salcedo     1  Multiple acquired skin tags  -     Ambulatory Referral to Dermatology; Future  -     Ambulatory Referral to Dermatology; Future    2  Screening for cardiovascular condition  -     Lipid panel; Future  -     Comprehensive metabolic panel; Future  -     CBC and differential; Future  -     Lipid panel  -     Comprehensive metabolic panel  -     CBC and differential    3  Primary hypertension  -     TSH, 3rd generation with Free T4 reflex; Future  -     TSH, 3rd generation with Free T4 reflex  -     valsartan (DIOVAN) 80 mg tablet; Take 1 tablet (80 mg total) by mouth daily    4  Screening for diabetes mellitus  -     Hemoglobin A1C; Future  -     Hemoglobin A1C           Subjective      Agrees to obtaining labs and beginning medications for his hypertension - will initiate valsartan 80 mg daily and he will follow up in 4-6 weeks for monitoring and possible medication changes  Discussed importance of preventative medicine - and will re-discuss at next visit    Here today actually for multiple skin tags on bilateral axilla which he would like removed - referral to dermatology      Hypertension  This is a chronic (reports has had "white coat sydrome' all life - dont like doctors- ) problem  The current episode started more than 1 year ago  The problem is uncontrolled  Pertinent negatives include no anxiety, blurred vision, headaches, neck pain, palpitations, peripheral edema, shortness of breath or sweats  There are no associated agents to hypertension  Risk factors for coronary artery disease include male gender, stress and family history  Past treatments include nothing  The current treatment provides no improvement  Compliance problems include exercise and diet        Review of Systems Constitutional: Negative  HENT: Negative  Eyes: Negative  Negative for blurred vision  Respiratory: Negative  Negative for shortness of breath  Cardiovascular: Negative  Negative for palpitations  Gastrointestinal: Negative  Endocrine: Negative  Genitourinary: Negative  Musculoskeletal: Negative  Negative for neck pain  Skin:        Multiple skin tags bilateral axilla   Allergic/Immunologic: Negative  Neurological: Negative  Negative for headaches  Hematological: Negative  Psychiatric/Behavioral: Negative  Current Outpatient Medications on File Prior to Visit   Medication Sig   • multivitamin (THERAGRAN) TABS Take 1 tablet by mouth daily       Objective     /90 (BP Location: Right arm, Patient Position: Sitting, Cuff Size: Large)   Pulse 83   Temp 98 5 °F (36 9 °C) (Tympanic)   Resp 22   Ht 6' (1 829 m)   Wt 106 kg (232 lb 12 8 oz)   SpO2 96%   BMI 31 57 kg/m²     Physical Exam  Vitals and nursing note reviewed  Constitutional:       Appearance: Normal appearance  HENT:      Head: Normocephalic and atraumatic  Nose: Nose normal  No congestion  Eyes:      Extraocular Movements: Extraocular movements intact  Conjunctiva/sclera: Conjunctivae normal       Pupils: Pupils are equal, round, and reactive to light  Cardiovascular:      Rate and Rhythm: Normal rate and regular rhythm  Pulses: Normal pulses  Heart sounds: Normal heart sounds  No murmur heard  Pulmonary:      Effort: Pulmonary effort is normal  No respiratory distress  Breath sounds: Normal breath sounds  Musculoskeletal:         General: Normal range of motion  Cervical back: Normal range of motion  Skin:     General: Skin is dry  Comments: Multiple skin tags noted ilateral axilla   Neurological:      Mental Status: He is alert and oriented to person, place, and time  Psychiatric:         Mood and Affect: Mood normal          Thought Content:  Thought content normal          Judgment: Judgment normal        Curtistine Close, CRNP

## 2023-03-29 ENCOUNTER — TELEPHONE (OUTPATIENT)
Dept: FAMILY MEDICINE CLINIC | Facility: CLINIC | Age: 58
End: 2023-03-29

## 2023-03-31 ENCOUNTER — OFFICE VISIT (OUTPATIENT)
Dept: FAMILY MEDICINE CLINIC | Facility: CLINIC | Age: 58
End: 2023-03-31

## 2023-03-31 VITALS
BODY MASS INDEX: 31.29 KG/M2 | HEART RATE: 78 BPM | OXYGEN SATURATION: 95 % | TEMPERATURE: 97.9 F | SYSTOLIC BLOOD PRESSURE: 139 MMHG | WEIGHT: 231 LBS | HEIGHT: 72 IN | RESPIRATION RATE: 18 BRPM | DIASTOLIC BLOOD PRESSURE: 80 MMHG

## 2023-03-31 DIAGNOSIS — I10 PRIMARY HYPERTENSION: Primary | ICD-10-CM

## 2023-03-31 NOTE — PATIENT INSTRUCTIONS
Champ Montaño (pain management)   [address] [place] [address]   [id number]    Continue all medications   Schedule annual visit

## 2023-03-31 NOTE — PROGRESS NOTES
Name: Vaibhav Richards      : 1965      MRN: [id number]  Encounter Provider: THIERRY Buchanan  Encounter Date: 3/31/2023   Encounter department: [facility]  Primary hypertension           Subjective      Hypertension  This is a chronic problem  The current episode started more than 1 year ago  The problem has been gradually improving since onset  The problem is controlled  Pertinent negatives include no anxiety, blurred vision, neck pain, orthopnea, peripheral edema or shortness of breath  There are no associated agents to hypertension  Risk factors for coronary artery disease include male gender, stress and family history  Past treatments include angiotensin blockers  The current treatment provides significant improvement  Compliance problems include exercise and diet  Review of Systems   Constitutional: Negative  HENT: Negative  Eyes: Negative  Negative for blurred vision  Respiratory: Negative  Negative for shortness of breath  Cardiovascular: Negative  Negative for orthopnea  Gastrointestinal: Negative  Endocrine: Negative  Genitourinary: Negative  Musculoskeletal: Negative  Negative for neck pain  Skin: Negative  Allergic/Immunologic: Negative  Neurological: Negative  Hematological: Negative  Psychiatric/Behavioral: Negative  Current Outpatient Medications on File Prior to Visit   Medication Sig   • multivitamin (THERAGRAN) TABS Take 1 tablet by mouth daily   • valsartan (DIOVAN) 80 mg tablet Take 1 tablet (80 mg total) by mouth daily       Objective     /80 (BP Location: Left arm, Patient Position: Sitting, Cuff Size: Large)   Pulse 78   Temp 97 9 °F (36 6 °C) (Tympanic)   Resp 18   Ht 6' (1 829 m)   Wt 105 kg (231 lb)   SpO2 95%   BMI 31 33 kg/m²     Physical Exam  Vitals and nursing note reviewed  Constitutional:       Appearance: He is obese  He is not ill-appearing     HENT: Head: Normocephalic and atraumatic  Nose: Nose normal  No congestion  Eyes:      General:         Right eye: No discharge  Left eye: No discharge  Extraocular Movements: Extraocular movements intact  Conjunctiva/sclera: Conjunctivae normal    Cardiovascular:      Rate and Rhythm: Normal rate and regular rhythm  Pulses: Normal pulses  Heart sounds: Normal heart sounds  No murmur heard  Musculoskeletal:         General: Normal range of motion  Cervical back: Normal range of motion  Skin:     General: Skin is dry  Capillary Refill: Capillary refill takes less than 2 seconds  Neurological:      Mental Status: He is alert and oriented to person, place, and time  Psychiatric:         Mood and Affect: Mood normal          Behavior: Behavior normal          Thought Content:  Thought content normal          Judgment: Judgment normal        THIERRY Edouard

## 2023-04-28 ENCOUNTER — TELEPHONE (OUTPATIENT)
Dept: FAMILY MEDICINE CLINIC | Facility: CLINIC | Age: 58
End: 2023-04-28

## 2023-04-28 NOTE — TELEPHONE ENCOUNTER
Chelita Chavarria called because he was referred to a pain specialist but he was told in order to be seen he has to be a current patient  If you have any others to recommend that would be great!

## 2023-04-28 NOTE — TELEPHONE ENCOUNTER
Pt confirmed Dr Dean Story office only accepting oncology pts at this time  Pt encouraged to try [facility] for [facility] registration  Info given and pt verbalized understanding

## 2023-07-21 DIAGNOSIS — I10 PRIMARY HYPERTENSION: ICD-10-CM

## 2023-07-21 RX ORDER — VALSARTAN 80 MG/1
80 TABLET ORAL DAILY
Qty: 90 TABLET | Refills: 0 | Status: SHIPPED | OUTPATIENT
Start: 2023-07-21

## 2023-07-21 NOTE — TELEPHONE ENCOUNTER
Please refill as he will be out by Monday- He did make an appt for his CP with Wendy Price  Thank you

## 2023-10-17 DIAGNOSIS — I10 PRIMARY HYPERTENSION: ICD-10-CM

## 2023-10-17 RX ORDER — VALSARTAN 80 MG/1
80 TABLET ORAL DAILY
Qty: 90 TABLET | Refills: 0 | Status: SHIPPED | OUTPATIENT
Start: 2023-10-17

## 2023-10-25 ENCOUNTER — OFFICE VISIT (OUTPATIENT)
Dept: FAMILY MEDICINE CLINIC | Facility: CLINIC | Age: 58
End: 2023-10-25
Payer: COMMERCIAL

## 2023-10-25 VITALS
BODY MASS INDEX: 31.86 KG/M2 | TEMPERATURE: 99.1 F | HEIGHT: 72 IN | DIASTOLIC BLOOD PRESSURE: 76 MMHG | SYSTOLIC BLOOD PRESSURE: 142 MMHG | HEART RATE: 71 BPM | WEIGHT: 235.2 LBS | RESPIRATION RATE: 17 BRPM | OXYGEN SATURATION: 100 %

## 2023-10-25 DIAGNOSIS — R63.5 WEIGHT GAIN: ICD-10-CM

## 2023-10-25 DIAGNOSIS — Z12.5 SCREENING FOR PROSTATE CANCER: Primary | ICD-10-CM

## 2023-10-25 DIAGNOSIS — Z13.6 SCREENING FOR CARDIOVASCULAR CONDITION: ICD-10-CM

## 2023-10-25 DIAGNOSIS — Z13.1 SCREENING FOR DIABETES MELLITUS: ICD-10-CM

## 2023-10-25 DIAGNOSIS — Z00.00 ANNUAL PHYSICAL EXAM: ICD-10-CM

## 2023-10-25 PROCEDURE — 99396 PREV VISIT EST AGE 40-64: CPT | Performed by: NURSE PRACTITIONER

## 2023-10-25 NOTE — PATIENT INSTRUCTIONS
Wellness Visit for Adults   AMBULATORY CARE:   A wellness visit  is when you see your healthcare provider to get screened for health problems. Your healthcare provider will also give you advice on how to stay healthy. Write down your questions so you remember to ask them. Ask your healthcare provider how often you should have a wellness visit. What happens at a wellness visit:  Your healthcare provider will ask about your health, and your family history of health problems. This includes high blood pressure, heart disease, and cancer. He or she will ask if you have symptoms that concern you, if you smoke, and about your mood. You may also be asked about your intake of medicines, supplements, food, and alcohol. Any of the following may be done: Your weight  will be checked. Your height may also be checked so your body mass index (BMI) can be calculated. Your BMI shows if you are at a healthy weight. Your blood pressure  and heart rate will be checked. Your temperature may also be checked. Blood and urine tests  may be done. Blood tests may be done to check your cholesterol levels. Abnormal cholesterol levels increase your risk for heart disease and stroke. You may also need a blood or urine test to check for diabetes if you are at increased risk. Urine tests may be done to look for signs of an infection or kidney disease. A physical exam  includes checking your heartbeat and lungs with a stethoscope. Your healthcare provider may also check your skin to look for sun damage. Screening tests  may be recommended. A screening test is done to check for diseases that may not cause symptoms. The screening tests you may need depend on your age, gender, family history, and lifestyle habits. For example, colorectal screening may be recommended if you are 48years old or older. Screening tests you need if you are a woman:   A Pap smear  is used to screen for cervical cancer.  Pap smears are usually done every 3 to 5 years depending on your age. You may need them more often if you have had abnormal Pap smear test results in the past. Ask your healthcare provider how often you should have a Pap smear. A mammogram  is an x-ray of your breasts to screen for breast cancer. Experts recommend mammograms every 2 years starting at age 48 years. You may need a mammogram at age 52 years or younger if you have an increased risk for breast cancer. Talk to your healthcare provider about when you should start having mammograms and how often you need them. Vaccines you may need:   Get an influenza vaccine  every year. The influenza vaccine protects you from the flu. Several types of viruses cause the flu. The viruses change over time, so new vaccines are made each year. Get a tetanus-diphtheria (Td) booster vaccine  every 10 years. This vaccine protects you against tetanus and diphtheria. Tetanus is a severe infection that may cause painful muscle spasms and lockjaw. Diphtheria is a severe bacterial infection that causes a thick covering in the back of your mouth and throat. Get a human papillomavirus (HPV) vaccine  if you are female and aged 23 to 32 or male 23 to 24 and never received it. This vaccine protects you from HPV infection. HPV is the most common infection spread by sexual contact. HPV may also cause vaginal, penile, and anal cancers. Get a pneumococcal vaccine  if you are aged 72 years or older. The pneumococcal vaccine is an injection given to protect you from pneumococcal disease. Pneumococcal disease is an infection caused by pneumococcal bacteria. The infection may cause pneumonia, meningitis, or an ear infection. Get a shingles vaccine  if you are 60 or older, even if you have had shingles before. The shingles vaccine is an injection to protect you from the varicella-zoster virus. This is the same virus that causes chickenpox.  Shingles is a painful rash that develops in people who had chickenpox or have been exposed to the virus. How to eat healthy:  My Plate is a model for planning healthy meals. It shows the types and amounts of foods that should go on your plate. Fruits and vegetables make up about half of your plate, and grains and protein make up the other half. A serving of dairy is included on the side of your plate. The amount of calories and serving sizes you need depends on your age, gender, weight, and height. Examples of healthy foods are listed below:  Eat a variety of vegetables  such as dark green, red, and orange vegetables. You can also include canned vegetables low in sodium (salt) and frozen vegetables without added butter or sauces. Eat a variety of fresh fruits , canned fruit in 100% juice, frozen fruit, and dried fruit. Include whole grains. At least half of the grains you eat should be whole grains. Examples include whole-wheat bread, wheat pasta, brown rice, and whole-grain cereals such as oatmeal.    Eat a variety of protein foods such as seafood (fish and shellfish), lean meat, and poultry without skin (turkey and chicken). Examples of lean meats include pork leg, shoulder, or tenderloin, and beef round, sirloin, tenderloin, and extra lean ground beef. Other protein foods include eggs and egg substitutes, beans, peas, soy products, nuts, and seeds. Choose low-fat dairy products such as skim or 1% milk or low-fat yogurt, cheese, and cottage cheese. Limit unhealthy fats  such as butter, hard margarine, and shortening. Exercise:  Exercise at least 30 minutes per day on most days of the week. Some examples of exercise include walking, biking, dancing, and swimming. You can also fit in more physical activity by taking the stairs instead of the elevator or parking farther away from stores. Include muscle strengthening activities 2 days each week. Regular exercise provides many health benefits.  It helps you manage your weight, and decreases your risk for type 2 diabetes, heart disease, stroke, and high blood pressure. Exercise can also help improve your mood. Ask your healthcare provider about the best exercise plan for you. General health and safety guidelines:   Do not smoke. Nicotine and other chemicals in cigarettes and cigars can cause lung damage. Ask your healthcare provider for information if you currently smoke and need help to quit. E-cigarettes or smokeless tobacco still contain nicotine. Talk to your healthcare provider before you use these products. Limit alcohol. A drink of alcohol is 12 ounces of beer, 5 ounces of wine, or 1½ ounces of liquor. Lose weight, if needed. Being overweight increases your risk of certain health conditions. These include heart disease, high blood pressure, type 2 diabetes, and certain types of cancer. Protect your skin. Do not sunbathe or use tanning beds. Use sunscreen with a SPF 15 or higher. Apply sunscreen at least 15 minutes before you go outside. Reapply sunscreen every 2 hours. Wear protective clothing, hats, and sunglasses when you are outside. Drive safely. Always wear your seatbelt. Make sure everyone in your car wears a seatbelt. A seatbelt can save your life if you are in an accident. Do not use your cell phone when you are driving. This could distract you and cause an accident. Pull over if you need to make a call or send a text message. Practice safe sex. Use latex condoms if are sexually active and have more than one partner. Your healthcare provider may recommend screening tests for sexually transmitted infections (STIs). Wear helmets, lifejackets, and protective gear. Always wear a helmet when you ride a bike or motorcycle, go skiing, or play sports that could cause a head injury. Wear protective equipment when you play sports. Wear a lifejacket when you are on a boat or doing water sports.     © Copyright [facility] 2023 Information is for End User's use only and may not be sold, redistributed or otherwise used for commercial purposes. The above information is an  only. It is not intended as medical advice for individual conditions or treatments. Talk to your doctor, nurse or pharmacist before following any medical regimen to see if it is safe and effective for you.

## 2023-10-25 NOTE — PROGRESS NOTES
[facility]    NAME: Karolyn Araujo  AGE: 61 y.o. SEX: male  : 1965     DATE: 10/25/2023     Assessment and Plan:     Problem List Items Addressed This Visit    None  Visit Diagnoses       Annual physical exam    -  Primary    Screening for cardiovascular condition        Relevant Orders    Lipid panel    CBC and differential    Comprehensive metabolic panel    Screening for diabetes mellitus        Relevant Orders    Hemoglobin A1C    Weight gain        Relevant Orders    TSH, 3rd generation with Free T4 reflex            Immunizations and preventive care screenings were discussed with patient today. Appropriate education was printed on patient's after visit summary. Discussed risks and benefits of prostate cancer screening. We discussed the controversial history of PSA screening for prostate cancer in the [facility] as well as the risk of over detection and over treatment of prostate cancer by way of PSA screening. The patient understands that PSA blood testing is an imperfect way to screen for prostate cancer and that elevated PSA levels in the blood may also be caused by infection, inflammation, prostatic trauma or manipulation, urological procedures, or by benign prostatic enlargement. The role of the digital rectal examination in prostate cancer screening was also discussed and I discussed with him that there is large interobserver variability in the findings of digital rectal examination. Counseling:  Alcohol/drug use: discussed moderation in alcohol intake, the recommendations for healthy alcohol use, and avoidance of illicit drug use. Dental Health: discussed importance of regular tooth brushing, flossing, and dental visits. Injury prevention: discussed safety/seat belts, safety helmets, smoke detectors, carbon dioxide detectors, and smoking near bedding or upholstery.   Sexual health: discussed sexually transmitted diseases, partner selection, use of condoms, avoidance of unintended pregnancy, and contraceptive alternatives. Exercise: the importance of regular exercise/physical activity was discussed. Recommend exercise 3-5 times per week for at least 30 minutes. BMI Counseling: Body mass index is 31.9 kg/m². The BMI is above normal. Nutrition recommendations include decreasing portion sizes, consuming healthier snacks, limiting drinks that contain sugar, increasing intake of lean protein and reducing intake of cholesterol. Exercise recommendations include moderate physical activity 150 minutes/week and strength training exercises. Rationale for BMI follow-up plan is due to patient being overweight or obese. Depression Screening and Follow-up Plan: Patient was screened for depression during today's encounter. They screened negative with a PHQ-2 score of 0. Return in 6 months (on 4/25/2024). Chief Complaint:     Chief Complaint   Patient presents with    Annual Exam     Check bp      History of Present Illness:     Adult Annual Physical   Patient here for a comprehensive physical exam. The patient reports no problems. Diet and Physical Activity  Diet/Nutrition: well balanced diet and consuming 3-5 servings of fruits/vegetables daily. Exercise: walking and 3-4 times a week on average. Depression Screening  PHQ-2/9 Depression Screening    Little interest or pleasure in doing things: 0 - not at all  Feeling down, depressed, or hopeless: 0 - not at all  PHQ-2 Score: 0  PHQ-2 Interpretation: Negative depression screen       General Health  Sleep: sleeps well and gets 4-6 hours of sleep on average. Hearing: normal - bilateral.  Vision: no vision problems, most recent eye exam >1 year ago, and wears glasses. Dental: no dental visits for >1 year and brushes teeth twice daily.  Health  Symptoms include: none    Advanced Care Planning  Do you have an advanced directive? no  Do you have a durable medical power of ? no     Review of Systems:     Review of Systems   Constitutional: Negative. HENT: Negative. Eyes: Negative. Respiratory: Negative. Cardiovascular: Negative. Gastrointestinal: Negative. Endocrine: Negative. Genitourinary: Negative. Musculoskeletal: Negative. Skin: Negative. Allergic/Immunologic: Negative. Neurological: Negative. Hematological: Negative. Psychiatric/Behavioral: Negative.         Past Medical History:     Past Medical History:   Diagnosis Date    Abscess     resolved 1/29/2016    Keratotic lesion     last assessed 5/2/2015    Localized adiposity     last assessed 10/20/2012    Patellofemoral syndrome of right knee     last assessed 4/28/2014    Skin growth     last assessed 4/21/2015    Squamous cell carcinoma of skin     Synovial cyst of popliteal space     last assessed 4/28/2014      Past Surgical History:     Past Surgical History:   Procedure Laterality Date    APPENDECTOMY  02/08/2010    Lucie Palma MD      Family History:     Family History   Problem Relation Age of Onset    No Known Problems Mother     No Known Problems Father       Social History:     Social History     Socioeconomic History    Marital status: /Civil Union     Spouse name: None    Number of children: None    Years of education: None    Highest education level: None   Occupational History     Comment: working full time   Tobacco Use    Smoking status: Never     Passive exposure: Past    Smokeless tobacco: Never   Vaping Use    Vaping Use: Never used   Substance and Sexual Activity    Alcohol use: Not Currently    Drug use: Never    Sexual activity: Yes     Partners: Female     Birth control/protection: None   Other Topics Concern    None   Social History Narrative    None     Social Determinants of Health     Financial Resource Strain: Not on file   Food Insecurity: Not on file   Transportation Needs: Not on file   Physical Activity: Inactive (9/14/2021)    Exercise Vital Sign     Days of Exercise per Week: 0 days     Minutes of Exercise per Session: 0 min   Stress: No Stress Concern Present (9/14/2021)    [facility]     Feeling of Stress : Not at all   Social Connections: Not on file   Intimate Partner Violence: Not At Risk (10/25/2023)    Humiliation, Afraid, Rape, and Kick questionnaire     Fear of Current or Ex-Partner: No     Emotionally Abused: No     Physically Abused: No     Sexually Abused: No   Housing Stability: Not on file      Current Medications:     Current Outpatient Medications   Medication Sig Dispense Refill    multivitamin (THERAGRAN) TABS Take 1 tablet by mouth daily      valsartan (DIOVAN) 80 mg tablet Take 1 tablet (80 mg total) by mouth daily 90 tablet 0     No current facility-administered medications for this visit. Allergies:     No Known Allergies   Physical Exam:     /76 (BP Location: Left arm, Patient Position: Sitting, Cuff Size: Large)   Pulse 71   Temp 99.1 °F (37.3 °C) (Tympanic)   Resp 17   Ht 6' (1.829 m)   Wt 107 kg (235 lb 3.2 oz)   SpO2 100%   BMI 31.90 kg/m²     Physical Exam  Vitals and nursing note reviewed. Constitutional:       General: He is not in acute distress. Appearance: Normal appearance. He is not ill-appearing. HENT:      Head: Normocephalic and atraumatic. Right Ear: Tympanic membrane, ear canal and external ear normal. There is no impacted cerumen. Left Ear: Tympanic membrane, ear canal and external ear normal. There is no impacted cerumen. Nose: Nose normal. No congestion. Mouth/Throat:      Mouth: Mucous membranes are moist.      Pharynx: Oropharynx is clear. No oropharyngeal exudate or posterior oropharyngeal erythema. Eyes:      General:         Right eye: No discharge. Left eye: No discharge. Extraocular Movements: Extraocular movements intact. Conjunctiva/sclera: Conjunctivae normal.      Pupils: Pupils are equal, round, and reactive to light. Cardiovascular:      Rate and Rhythm: Normal rate and regular rhythm. Pulses: Normal pulses. Heart sounds: Normal heart sounds. No murmur heard. Pulmonary:      Effort: Pulmonary effort is normal. No respiratory distress. Breath sounds: Normal breath sounds. Abdominal:      General: Bowel sounds are normal.      Palpations: Abdomen is soft. Tenderness: There is no right CVA tenderness or left CVA tenderness. Musculoskeletal:         General: No swelling. Normal range of motion. Cervical back: Normal range of motion. Right lower leg: No edema. Left lower leg: No edema. Skin:     General: Skin is warm and dry. Capillary Refill: Capillary refill takes less than 2 seconds. Coloration: Skin is not jaundiced. Findings: No erythema. Neurological:      Mental Status: He is alert and oriented to person, place, and time. Psychiatric:         Mood and Affect: Mood normal.         Behavior: Behavior normal.         Thought Content:  Thought content normal.         Judgment: Judgment normal.          Sidra Calabrese [facility]

## 2023-11-10 DIAGNOSIS — R74.8 ELEVATED LIVER ENZYMES: Primary | ICD-10-CM

## 2023-11-10 LAB
ALBUMIN SERPL-MCNC: 4.3 G/DL (ref 3.8–4.9)
ALBUMIN/GLOB SERPL: 1.1 {RATIO} (ref 1.2–2.2)
ALP SERPL-CCNC: 75 IU/L (ref 44–121)
ALT SERPL-CCNC: 66 IU/L (ref 0–44)
AST SERPL-CCNC: 50 IU/L (ref 0–40)
BASOPHILS # BLD AUTO: 0.1 X10E3/UL (ref 0–0.2)
BASOPHILS NFR BLD AUTO: 1 %
BILIRUB SERPL-MCNC: 0.4 MG/DL (ref 0–1.2)
BUN SERPL-MCNC: 16 MG/DL (ref 6–24)
BUN/CREAT SERPL: 15 (ref 9–20)
CALCIUM SERPL-MCNC: 9.7 MG/DL (ref 8.7–10.2)
CHLORIDE SERPL-SCNC: 101 MMOL/L (ref 96–106)
CHOLEST SERPL-MCNC: 223 MG/DL (ref 100–199)
CHOLEST/HDLC SERPL: 4.7 RATIO (ref 0–5)
CO2 SERPL-SCNC: 22 MMOL/L (ref 20–29)
CREAT SERPL-MCNC: 1.07 MG/DL (ref 0.76–1.27)
EGFR: 80 ML/MIN/1.73
EOSINOPHIL # BLD AUTO: 0.2 X10E3/UL (ref 0–0.4)
EOSINOPHIL NFR BLD AUTO: 2 %
ERYTHROCYTE [DISTWIDTH] IN BLOOD BY AUTOMATED COUNT: 12.6 % (ref 11.6–15.4)
EST. AVERAGE GLUCOSE BLD GHB EST-MCNC: 123 MG/DL
GLOBULIN SER-MCNC: 4 G/DL (ref 1.5–4.5)
GLUCOSE SERPL-MCNC: 117 MG/DL (ref 70–99)
HBA1C MFR BLD: 5.9 % (ref 4.8–5.6)
HCT VFR BLD AUTO: 47.4 % (ref 37.5–51)
HDLC SERPL-MCNC: 47 MG/DL
HGB BLD-MCNC: 16.6 G/DL (ref 13–17.7)
IMM GRANULOCYTES # BLD: 0 X10E3/UL (ref 0–0.1)
IMM GRANULOCYTES NFR BLD: 0 %
LDLC SERPL CALC-MCNC: 149 MG/DL (ref 0–99)
LYMPHOCYTES # BLD AUTO: 2.9 X10E3/UL (ref 0.7–3.1)
LYMPHOCYTES NFR BLD AUTO: 38 %
MCH RBC QN AUTO: 31.6 PG (ref 26.6–33)
MCHC RBC AUTO-ENTMCNC: 35 G/DL (ref 31.5–35.7)
MCV RBC AUTO: 90 FL (ref 79–97)
MONOCYTES # BLD AUTO: 0.7 X10E3/UL (ref 0.1–0.9)
MONOCYTES NFR BLD AUTO: 9 %
NEUTROPHILS # BLD AUTO: 3.7 X10E3/UL (ref 1.4–7)
NEUTROPHILS NFR BLD AUTO: 50 %
PLATELET # BLD AUTO: 294 X10E3/UL (ref 150–450)
POTASSIUM SERPL-SCNC: 4.5 MMOL/L (ref 3.5–5.2)
PROT SERPL-MCNC: 8.3 G/DL (ref 6–8.5)
PSA SERPL DL<=0.01 NG/ML-MCNC: 0.39 NG/ML (ref 0–4)
RBC # BLD AUTO: 5.26 X10E6/UL (ref 4.14–5.8)
SL AMB VLDL CHOLESTEROL CALC: 27 MG/DL (ref 5–40)
SODIUM SERPL-SCNC: 139 MMOL/L (ref 134–144)
TRIGL SERPL-MCNC: 148 MG/DL (ref 0–149)
TSH SERPL DL<=0.005 MIU/L-ACNC: 3.8 UIU/ML (ref 0.45–4.5)
WBC # BLD AUTO: 7.6 X10E3/UL (ref 3.4–10.8)

## 2023-11-10 NOTE — PROGRESS NOTES
Discussed labs - cholesterol and HemA1C elevated - Heart healthy diet and moderate exercise and will recheck in 6 months.   I am concerned about his elevated liver enzymes- denies use of alcohol and tylenol products - there has been elevation in the past - he has been reluctant to have labwork competed in the past  He agrees to [address] of liver and Hepatitis profile for now   Feels like"tests are ordered for the money" discussed with patient that fatty liver disease is as serious as cirrhosis from alcohol

## 2023-12-07 ENCOUNTER — HOSPITAL ENCOUNTER (OUTPATIENT)
Dept: ULTRASOUND IMAGING | Facility: CLINIC | Age: 58
Discharge: HOME | End: 2023-12-07
Payer: COMMERCIAL

## 2023-12-07 DIAGNOSIS — R74.8 ELEVATED LIVER ENZYMES: ICD-10-CM

## 2023-12-07 PROCEDURE — 76705 ECHO EXAM OF ABDOMEN: CPT

## 2023-12-08 LAB
HAV IGM SERPL QL IA: NEGATIVE
HBV CORE IGM SERPL QL IA: NEGATIVE
HBV SURFACE AG SERPL QL IA: NEGATIVE
HCV AB S/CO SERPL IA: NON REACTIVE
SL AMB INTERPRETATION: NORMAL

## 2023-12-13 ENCOUNTER — TELEPHONE (OUTPATIENT)
Dept: FAMILY MEDICINE CLINIC | Facility: CLINIC | Age: 58
End: 2023-12-13

## 2023-12-13 NOTE — TELEPHONE ENCOUNTER
Reviewed US results - discussed changes in diet - reports already down 7 pounds - to recheck labs in 4-6 months, defers GI at this time

## 2024-01-15 DIAGNOSIS — I10 PRIMARY HYPERTENSION: ICD-10-CM

## 2024-01-15 RX ORDER — VALSARTAN 80 MG/1
80 TABLET ORAL DAILY
Qty: 90 TABLET | Refills: 0 | Status: SHIPPED | OUTPATIENT
Start: 2024-01-15

## 2024-02-01 ENCOUNTER — APPOINTMENT (OUTPATIENT)
Dept: RADIOLOGY | Facility: CLINIC | Age: 59
End: 2024-02-01
Payer: COMMERCIAL

## 2024-02-01 ENCOUNTER — OFFICE VISIT (OUTPATIENT)
Dept: OBGYN CLINIC | Facility: CLINIC | Age: 59
End: 2024-02-01
Payer: COMMERCIAL

## 2024-02-01 VITALS
DIASTOLIC BLOOD PRESSURE: 84 MMHG | SYSTOLIC BLOOD PRESSURE: 140 MMHG | HEIGHT: 72 IN | WEIGHT: 237 LBS | BODY MASS INDEX: 32.1 KG/M2

## 2024-02-01 DIAGNOSIS — M17.11 PRIMARY OSTEOARTHRITIS OF RIGHT KNEE: Primary | ICD-10-CM

## 2024-02-01 DIAGNOSIS — M17.11 OSTEOARTHROSIS, LOCALIZED, PRIMARY, KNEE, RIGHT: ICD-10-CM

## 2024-02-01 PROCEDURE — 99203 OFFICE O/P NEW LOW 30 MIN: CPT | Performed by: ORTHOPAEDIC SURGERY

## 2024-02-01 PROCEDURE — 73564 X-RAY EXAM KNEE 4 OR MORE: CPT

## 2024-02-01 NOTE — PROGRESS NOTES
Assessment:     1. Primary osteoarthritis of right knee        Plan:     Problem List Items Addressed This Visit          Musculoskeletal and Integument    Primary osteoarthritis of right knee - Primary     Findings consistent with right knee mild osteoarthritis, effusion. Imaging and prognosis reviewed with patient. Since patient received significant relief from Euflexxa series 3 years ago he will have referral placed again to repeat the series. He will have swelling removed from the knee prior to gel injection. Stationary bike, elliptical for low impact exercise. Avoid kneeling, squatting, lunges if possible. OTC anti inflammatories as needed for pain. See patient back once visco approved.  All patient's questions were answered to his satisfaction.  This note is created using dictation transcription.  It may contain typographical errors, grammatical errors, improperly dictated words, background noise and other errors.         Relevant Orders    XR knee 4+ vw right injury    Injection Procedure Prior Authorization      Subjective:     Patient ID: Daquan Dale is a 58 y.o. male.  Chief Complaint:  58 yr old male in for evaluation of right knee pain. Treated in past with Dr Miranda and Bassam for osteoarthritis of his knee. Last seen 2020 and had CSI and gel injections. He states last set of Euflexxa injections lasted 3 yrs and wore off a few weeks ago. He is  and difficulty kneeling, getting up from low positions, squatting, prolonged standing, walking is painful. Rest will alleviate. Ache, throbbing at night more severe during day. Crunching, grinding, popping with motion of his knee. Knee feels tight, swollen. Denies any locking or instability. No new injury. 10/10 pain at its worst. He would like to repeat gel injections.     Allergy:  No Known Allergies  Medications:  all current active meds have been reviewed  Past Medical History:  Past Medical History:   Diagnosis Date    Abscess     resolved  1/29/2016    Keratotic lesion     last assessed 5/2/2015    Localized adiposity     last assessed 10/20/2012    Patellofemoral syndrome of right knee     last assessed 4/28/2014    Skin growth     last assessed 4/21/2015    Squamous cell carcinoma of skin     Synovial cyst of popliteal space     last assessed 4/28/2014     Past Surgical History:  Past Surgical History:   Procedure Laterality Date    APPENDECTOMY  02/08/2010    BERNADINE FRANKLIN MD     Family History:  Family History   Problem Relation Age of Onset    No Known Problems Mother     No Known Problems Father      Social History:  Social History     Substance and Sexual Activity   Alcohol Use Not Currently     Social History     Substance and Sexual Activity   Drug Use Never     Social History     Tobacco Use   Smoking Status Never    Passive exposure: Past   Smokeless Tobacco Never     Review of Systems   Constitutional:  Negative for chills and fever.   HENT:  Negative for ear pain and sore throat.    Eyes:  Negative for pain and visual disturbance.   Respiratory:  Negative for cough and shortness of breath.    Cardiovascular:  Negative for chest pain and palpitations.   Gastrointestinal:  Negative for abdominal pain and vomiting.   Genitourinary:  Negative for dysuria and hematuria.   Musculoskeletal:  Positive for arthralgias (right knee) and joint swelling (Right knee). Negative for back pain and gait problem.   Skin:  Negative for color change and rash.   Neurological:  Negative for seizures and syncope.   Psychiatric/Behavioral: Negative.     All other systems reviewed and are negative.        Objective:  BP Readings from Last 1 Encounters:   02/01/24 140/84      Wt Readings from Last 1 Encounters:   02/01/24 108 kg (237 lb)      BMI:   Estimated body mass index is 32.14 kg/m² as calculated from the following:    Height as of this encounter: 6' (1.829 m).    Weight as of this encounter: 108 kg (237 lb).  BSA:   Estimated body surface area is 2.29  meters squared as calculated from the following:    Height as of this encounter: 6' (1.829 m).    Weight as of this encounter: 108 kg (237 lb).   Physical Exam  Vitals and nursing note reviewed.   Constitutional:       Appearance: Normal appearance. He is well-developed.   HENT:      Head: Normocephalic and atraumatic.      Right Ear: External ear normal.      Left Ear: External ear normal.   Eyes:      Extraocular Movements: Extraocular movements intact.      Conjunctiva/sclera: Conjunctivae normal.   Pulmonary:      Effort: Pulmonary effort is normal.   Musculoskeletal:         General: Tenderness (right knee arthralgia) present.      Cervical back: Neck supple.      Right knee: Effusion (Grade 1) present.      Instability Tests: Medial Rosemary test negative and lateral Rosemary test negative.   Skin:     General: Skin is warm and dry.   Neurological:      Mental Status: He is alert and oriented to person, place, and time.      Deep Tendon Reflexes: Reflexes are normal and symmetric.   Psychiatric:         Mood and Affect: Mood normal.         Behavior: Behavior normal.       Right Knee Exam     Muscle Strength   The patient has normal right knee strength.    Tenderness   The patient is experiencing no tenderness.     Range of Motion   Extension:  normal   Flexion:  normal     Tests   Rosemary:  Medial - negative Lateral - negative  Varus: negative Valgus: negative  Patellar apprehension: negative    Other   Erythema: absent  Scars: absent  Sensation: normal  Pulse: present  Swelling: mild  Effusion: effusion (Grade 1) present    Comments:  Crepitation with motion of patella             I have personally reviewed pertinent films in PACS and my interpretation is xr right knee demonstrates mild-moderate osteoarthritis with marginal osteophytes.     Scribe Attestation      I,:  Erik Eddy am acting as a scribe while in the presence of the attending physician.:       I,:  Long Taylor MD personally performed the  services described in this documentation    as scribed in my presence.:

## 2024-02-01 NOTE — ASSESSMENT & PLAN NOTE
Findings consistent with right knee mild osteoarthritis, effusion. Imaging and prognosis reviewed with patient. Since patient received significant relief from Euflexxa series 3 years ago he will have referral placed again to repeat the series. He will have swelling removed from the knee prior to gel injection. Stationary bike, elliptical for low impact exercise. Avoid kneeling, squatting, lunges if possible. OTC anti inflammatories as needed for pain. See patient back once visco approved.  All patient's questions were answered to his satisfaction.  This note is created using dictation transcription.  It may contain typographical errors, grammatical errors, improperly dictated words, background noise and other errors.

## 2024-02-14 ENCOUNTER — PROCEDURE VISIT (OUTPATIENT)
Dept: OBGYN CLINIC | Facility: CLINIC | Age: 59
End: 2024-02-14
Payer: COMMERCIAL

## 2024-02-14 VITALS
BODY MASS INDEX: 31.97 KG/M2 | SYSTOLIC BLOOD PRESSURE: 138 MMHG | DIASTOLIC BLOOD PRESSURE: 84 MMHG | WEIGHT: 236 LBS | HEIGHT: 72 IN

## 2024-02-14 DIAGNOSIS — M17.11 PRIMARY OSTEOARTHRITIS OF RIGHT KNEE: Primary | ICD-10-CM

## 2024-02-14 PROCEDURE — 20610 DRAIN/INJ JOINT/BURSA W/O US: CPT | Performed by: PHYSICIAN ASSISTANT

## 2024-02-14 NOTE — PROGRESS NOTES
Assessment:     1. Primary osteoarthritis of right knee          Plan:     Problem List Items Addressed This Visit          Musculoskeletal and Integument    Primary osteoarthritis of right knee - Primary     Findings consistent with right knee osteoarthritis.  Findings and treatment options were discussed with the patient.  The first of 3 right knee Orthovisc injections were given today.  He tolerated the procedure well.  Advised to apply cold compress today.  Follow-up in 1 week for the second injection.  All questions were answered to patient's satisfaction.         Relevant Medications    sodium hyaluronate (ORTHOVISC) injection SOSY 30 mg (Completed)    Other Relevant Orders    Large joint arthrocentesis: R knee (Completed)        Subjective:     Patient ID: Daquan Dale is a 58 y.o. male.  Chief Complaint:  This is a 58-year-old white male following up for right knee osteoarthritis.  He is here to begin a series of Orthovisc injections.  Pain is aching in nature.  He states his symptoms have been better the past few days.  Swelling has improved.  He works as a  and has to kneel and squat multiple times a day.    Allergy:  No Known Allergies  Medications:  all current active meds have been reviewed  Past Medical History:  Past Medical History:   Diagnosis Date    Abscess     resolved 1/29/2016    Keratotic lesion     last assessed 5/2/2015    Localized adiposity     last assessed 10/20/2012    Patellofemoral syndrome of right knee     last assessed 4/28/2014    Skin growth     last assessed 4/21/2015    Squamous cell carcinoma of skin     Synovial cyst of popliteal space     last assessed 4/28/2014     Past Surgical History:  Past Surgical History:   Procedure Laterality Date    APPENDECTOMY  02/08/2010    BERNADINE FRANKLIN MD     Family History:  Family History   Problem Relation Age of Onset    No Known Problems Mother     No Known Problems Father      Social History:  Social History     Substance  and Sexual Activity   Alcohol Use Not Currently     Social History     Substance and Sexual Activity   Drug Use Never     Social History     Tobacco Use   Smoking Status Never    Passive exposure: Past   Smokeless Tobacco Never     Review of Systems   Constitutional:  Negative for chills and fever.   HENT:  Negative for ear pain and sore throat.    Eyes:  Negative for pain and visual disturbance.   Respiratory:  Negative for cough and shortness of breath.    Cardiovascular:  Negative for chest pain and palpitations.   Gastrointestinal:  Negative for abdominal pain and vomiting.   Genitourinary:  Negative for dysuria and hematuria.   Musculoskeletal:  Positive for arthralgias (right knee) and joint swelling (Right knee). Negative for back pain and gait problem.   Skin:  Negative for color change and rash.   Neurological:  Negative for seizures and syncope.   Psychiatric/Behavioral: Negative.     All other systems reviewed and are negative.        Objective:  BP Readings from Last 1 Encounters:   02/14/24 138/84      Wt Readings from Last 1 Encounters:   02/14/24 107 kg (236 lb)      BMI:   Estimated body mass index is 32.01 kg/m² as calculated from the following:    Height as of this encounter: 6' (1.829 m).    Weight as of this encounter: 107 kg (236 lb).  BSA:   Estimated body surface area is 2.29 meters squared as calculated from the following:    Height as of this encounter: 6' (1.829 m).    Weight as of this encounter: 107 kg (236 lb).   Physical Exam  Vitals and nursing note reviewed.   Constitutional:       Appearance: Normal appearance. He is well-developed.   HENT:      Head: Normocephalic and atraumatic.      Right Ear: External ear normal.      Left Ear: External ear normal.   Eyes:      Extraocular Movements: Extraocular movements intact.      Conjunctiva/sclera: Conjunctivae normal.   Pulmonary:      Effort: Pulmonary effort is normal.   Musculoskeletal:      Cervical back: Neck supple.      Right knee:  Effusion (trace) present.      Instability Tests: Medial Rosemary test negative and lateral Rosemary test negative.   Skin:     General: Skin is warm and dry.   Neurological:      Mental Status: He is alert and oriented to person, place, and time.      Deep Tendon Reflexes: Reflexes are normal and symmetric.   Psychiatric:         Mood and Affect: Mood normal.         Behavior: Behavior normal.       Right Knee Exam     Muscle Strength   The patient has normal right knee strength.    Tenderness   The patient is experiencing no tenderness.     Range of Motion   Extension:  normal   Flexion:  normal     Tests   Rosemary:  Medial - negative Lateral - negative  Varus: negative Valgus: negative  Patellar apprehension: negative    Other   Erythema: absent  Scars: absent  Sensation: normal  Pulse: present  Swelling: mild  Effusion: effusion (trace) present    Comments:  Crepitation with motion of patella             No new imaging.      Large joint arthrocentesis: R knee  Universal Protocol:  Consent: Verbal consent obtained.  Risks and benefits: risks, benefits and alternatives were discussed  Consent given by: patient  Patient understanding: patient states understanding of the procedure being performed  Supporting Documentation  Indications: pain and joint swelling   Procedure Details  Location: knee - R knee  Preparation: Patient was prepped and draped in the usual sterile fashion  Needle size: 18 G (25-gauge for anesthetic)  Approach: superior  Medications administered: 30 mg sodium hyaluronate 30 mg/2 mL    Aspirate amount: 0 mL  Patient tolerance: patient tolerated the procedure well with no immediate complications  Dressing:  Sterile dressing applied    5 cc 1% lidocaine used for anesthetic

## 2024-02-14 NOTE — ASSESSMENT & PLAN NOTE
Findings consistent with right knee osteoarthritis.  Findings and treatment options were discussed with the patient.  The first of 3 right knee Orthovisc injections were given today.  He tolerated the procedure well.  Advised to apply cold compress today.  Follow-up in 1 week for the second injection.  All questions were answered to patient's satisfaction.

## 2024-02-21 ENCOUNTER — PROCEDURE VISIT (OUTPATIENT)
Dept: OBGYN CLINIC | Facility: CLINIC | Age: 59
End: 2024-02-21
Payer: COMMERCIAL

## 2024-02-21 VITALS
BODY MASS INDEX: 31.83 KG/M2 | SYSTOLIC BLOOD PRESSURE: 122 MMHG | WEIGHT: 235 LBS | DIASTOLIC BLOOD PRESSURE: 82 MMHG | HEIGHT: 72 IN

## 2024-02-21 DIAGNOSIS — M17.11 PRIMARY OSTEOARTHRITIS OF RIGHT KNEE: Primary | ICD-10-CM

## 2024-02-21 PROBLEM — J03.90 ACUTE TONSILLITIS: Status: RESOLVED | Noted: 2017-12-22 | Resolved: 2024-02-21

## 2024-02-21 PROCEDURE — 20610 DRAIN/INJ JOINT/BURSA W/O US: CPT | Performed by: PHYSICIAN ASSISTANT

## 2024-02-21 NOTE — ASSESSMENT & PLAN NOTE
Findings consistent with right knee osteoarthritis.  Findings and treatment options were discussed with the patient.  The 2nd of 3 right knee Orthovisc injections were given today.  He tolerated the procedure well.  Advised to apply cold compress today.  Follow-up in 1 week for the 3rd injection.  All questions were answered to patient's satisfaction.

## 2024-02-21 NOTE — PROGRESS NOTES
Assessment:     1. Primary osteoarthritis of right knee          Plan:     Problem List Items Addressed This Visit          Musculoskeletal and Integument    Primary osteoarthritis of right knee - Primary     Findings consistent with right knee osteoarthritis.  Findings and treatment options were discussed with the patient.  The 2nd of 3 right knee Orthovisc injections were given today.  He tolerated the procedure well.  Advised to apply cold compress today.  Follow-up in 1 week for the 3rd injection.  All questions were answered to patient's satisfaction.         Relevant Medications    sodium hyaluronate (ORTHOVISC) injection SOSY 30 mg (Completed)    Other Relevant Orders    Large joint arthrocentesis: R knee (Completed)          Subjective:     Patient ID: Daquan Dale is a 58 y.o. male.  Chief Complaint:  This is a 58-year-old white male following up for right knee osteoarthritis.  He is here for the second of 3 right knee Orthovisc injections.  No issues after the first injection.    Allergy:  No Known Allergies  Medications:  all current active meds have been reviewed  Past Medical History:  Past Medical History:   Diagnosis Date    Abscess     resolved 1/29/2016    Keratotic lesion     last assessed 5/2/2015    Localized adiposity     last assessed 10/20/2012    Patellofemoral syndrome of right knee     last assessed 4/28/2014    Skin growth     last assessed 4/21/2015    Squamous cell carcinoma of skin     Synovial cyst of popliteal space     last assessed 4/28/2014     Past Surgical History:  Past Surgical History:   Procedure Laterality Date    APPENDECTOMY  02/08/2010    BERNADINE FRANKLIN MD     Family History:  Family History   Problem Relation Age of Onset    No Known Problems Mother     No Known Problems Father      Social History:  Social History     Substance and Sexual Activity   Alcohol Use Not Currently     Social History     Substance and Sexual Activity   Drug Use Never     Social History      Tobacco Use   Smoking Status Never    Passive exposure: Past   Smokeless Tobacco Never     Review of Systems   Constitutional:  Negative for chills and fever.   HENT:  Negative for ear pain and sore throat.    Eyes:  Negative for pain and visual disturbance.   Respiratory:  Negative for cough and shortness of breath.    Cardiovascular:  Negative for chest pain and palpitations.   Gastrointestinal:  Negative for abdominal pain and vomiting.   Genitourinary:  Negative for dysuria and hematuria.   Musculoskeletal:  Positive for arthralgias (right knee) and joint swelling (Right knee). Negative for back pain and gait problem.   Skin:  Negative for color change and rash.   Neurological:  Negative for seizures and syncope.   Psychiatric/Behavioral: Negative.     All other systems reviewed and are negative.        Objective:  BP Readings from Last 1 Encounters:   02/21/24 122/82      Wt Readings from Last 1 Encounters:   02/21/24 107 kg (235 lb)      BMI:   Estimated body mass index is 31.87 kg/m² as calculated from the following:    Height as of this encounter: 6' (1.829 m).    Weight as of this encounter: 107 kg (235 lb).  BSA:   Estimated body surface area is 2.29 meters squared as calculated from the following:    Height as of this encounter: 6' (1.829 m).    Weight as of this encounter: 107 kg (235 lb).   Physical Exam  Vitals and nursing note reviewed.   Constitutional:       Appearance: Normal appearance. He is well-developed.   HENT:      Head: Normocephalic and atraumatic.      Right Ear: External ear normal.      Left Ear: External ear normal.   Eyes:      Extraocular Movements: Extraocular movements intact.      Conjunctiva/sclera: Conjunctivae normal.   Pulmonary:      Effort: Pulmonary effort is normal.   Musculoskeletal:      Cervical back: Neck supple.      Right knee: No effusion.      Instability Tests: Medial Rosemary test negative and lateral Rosemary test negative.   Skin:     General: Skin is warm  and dry.   Neurological:      Mental Status: He is alert and oriented to person, place, and time.      Deep Tendon Reflexes: Reflexes are normal and symmetric.   Psychiatric:         Mood and Affect: Mood normal.         Behavior: Behavior normal.       Right Knee Exam     Muscle Strength   The patient has normal right knee strength.    Tenderness   The patient is experiencing no tenderness.     Range of Motion   Extension:  normal   Flexion:  normal     Tests   Rosemary:  Medial - negative Lateral - negative  Varus: negative Valgus: negative  Patellar apprehension: negative    Other   Erythema: absent  Scars: absent  Sensation: normal  Pulse: present  Swelling: mild  Effusion: no effusion present    Comments:  Crepitation with motion of patella             No new imaging.      Large joint arthrocentesis: R knee  Universal Protocol:  Consent: Verbal consent obtained.  Risks and benefits: risks, benefits and alternatives were discussed  Consent given by: patient  Patient understanding: patient states understanding of the procedure being performed  Supporting Documentation  Indications: pain   Procedure Details  Location: knee - R knee  Preparation: Patient was prepped and draped in the usual sterile fashion  Needle size: 22 G  Approach: superior  Medications administered: 30 mg sodium hyaluronate 30 mg/2 mL    Patient tolerance: patient tolerated the procedure well with no immediate complications  Dressing:  Sterile dressing applied

## 2024-02-28 ENCOUNTER — PROCEDURE VISIT (OUTPATIENT)
Dept: OBGYN CLINIC | Facility: CLINIC | Age: 59
End: 2024-02-28
Payer: COMMERCIAL

## 2024-02-28 VITALS
BODY MASS INDEX: 32.1 KG/M2 | HEIGHT: 72 IN | DIASTOLIC BLOOD PRESSURE: 80 MMHG | WEIGHT: 237 LBS | SYSTOLIC BLOOD PRESSURE: 122 MMHG

## 2024-02-28 DIAGNOSIS — M17.11 PRIMARY OSTEOARTHRITIS OF RIGHT KNEE: Primary | ICD-10-CM

## 2024-02-28 PROCEDURE — 20610 DRAIN/INJ JOINT/BURSA W/O US: CPT | Performed by: PHYSICIAN ASSISTANT

## 2024-02-28 NOTE — ASSESSMENT & PLAN NOTE
Findings consistent with right knee osteoarthritis.  Findings and treatment options were discussed with the patient.  The 3rd of 3 right knee Orthovisc injections were given today.  He tolerated the procedure well.  Advised to apply cold compress today.  Follow-up as needed if symptoms return.  Advised patient the soonest he can have another series is in 6 months.  All questions were answered to patient's satisfaction.

## 2024-02-28 NOTE — PROGRESS NOTES
Assessment:     1. Primary osteoarthritis of right knee            Plan:     Problem List Items Addressed This Visit          Musculoskeletal and Integument    Primary osteoarthritis of right knee - Primary     Findings consistent with right knee osteoarthritis.  Findings and treatment options were discussed with the patient.  The 3rd of 3 right knee Orthovisc injections were given today.  He tolerated the procedure well.  Advised to apply cold compress today.  Follow-up as needed if symptoms return.  Advised patient the soonest he can have another series is in 6 months.  All questions were answered to patient's satisfaction.         Relevant Medications    sodium hyaluronate (ORTHOVISC) injection SOSY 30 mg (Completed)    Other Relevant Orders    Large joint arthrocentesis: R knee (Completed)        Subjective:     Patient ID: Daquan Dale is a 58 y.o. male.  Chief Complaint:  This is a 58-year-old white male following up for right knee osteoarthritis.  He is here for the 3rd of 3 right knee Orthovisc injections.  No issues after the second injection.  He states he is feeling 85% better.    Allergy:  No Known Allergies  Medications:  all current active meds have been reviewed  Past Medical History:  Past Medical History:   Diagnosis Date    Abscess     resolved 1/29/2016    Keratotic lesion     last assessed 5/2/2015    Localized adiposity     last assessed 10/20/2012    Patellofemoral syndrome of right knee     last assessed 4/28/2014    Skin growth     last assessed 4/21/2015    Squamous cell carcinoma of skin     Synovial cyst of popliteal space     last assessed 4/28/2014     Past Surgical History:  Past Surgical History:   Procedure Laterality Date    APPENDECTOMY  02/08/2010    BERNADINE FRANKLIN MD     Family History:  Family History   Problem Relation Age of Onset    No Known Problems Mother     No Known Problems Father      Social History:  Social History     Substance and Sexual Activity   Alcohol Use  Not Currently     Social History     Substance and Sexual Activity   Drug Use Never     Social History     Tobacco Use   Smoking Status Never    Passive exposure: Past   Smokeless Tobacco Never     Review of Systems   Constitutional:  Negative for chills and fever.   HENT:  Negative for ear pain and sore throat.    Eyes:  Negative for pain and visual disturbance.   Respiratory:  Negative for cough and shortness of breath.    Cardiovascular:  Negative for chest pain and palpitations.   Gastrointestinal:  Negative for abdominal pain and vomiting.   Genitourinary:  Negative for dysuria and hematuria.   Musculoskeletal:  Positive for arthralgias (right knee) and joint swelling (Right knee). Negative for back pain and gait problem.   Skin:  Negative for color change and rash.   Neurological:  Negative for seizures and syncope.   Psychiatric/Behavioral: Negative.     All other systems reviewed and are negative.        Objective:  BP Readings from Last 1 Encounters:   02/28/24 122/80      Wt Readings from Last 1 Encounters:   02/28/24 108 kg (237 lb)      BMI:   Estimated body mass index is 32.14 kg/m² as calculated from the following:    Height as of this encounter: 6' (1.829 m).    Weight as of this encounter: 108 kg (237 lb).  BSA:   Estimated body surface area is 2.29 meters squared as calculated from the following:    Height as of this encounter: 6' (1.829 m).    Weight as of this encounter: 108 kg (237 lb).   Physical Exam  Vitals and nursing note reviewed.   Constitutional:       Appearance: Normal appearance. He is well-developed.   HENT:      Head: Normocephalic and atraumatic.      Right Ear: External ear normal.      Left Ear: External ear normal.   Eyes:      Extraocular Movements: Extraocular movements intact.      Conjunctiva/sclera: Conjunctivae normal.   Pulmonary:      Effort: Pulmonary effort is normal.   Musculoskeletal:      Cervical back: Neck supple.      Right knee: No effusion.      Instability  Tests: Medial Rosemary test negative and lateral Rosemary test negative.   Skin:     General: Skin is warm and dry.   Neurological:      Mental Status: He is alert and oriented to person, place, and time.      Deep Tendon Reflexes: Reflexes are normal and symmetric.   Psychiatric:         Mood and Affect: Mood normal.         Behavior: Behavior normal.       Right Knee Exam     Muscle Strength   The patient has normal right knee strength.    Tenderness   The patient is experiencing no tenderness.     Range of Motion   Extension:  normal   Flexion:  normal     Tests   Rosemary:  Medial - negative Lateral - negative  Varus: negative Valgus: negative  Patellar apprehension: negative    Other   Erythema: absent  Scars: absent  Sensation: normal  Pulse: present  Swelling: mild  Effusion: no effusion present    Comments:  Crepitation with motion of patella             No new imaging.      Large joint arthrocentesis: R knee  Universal Protocol:  Consent: Verbal consent obtained.  Risks and benefits: risks, benefits and alternatives were discussed  Consent given by: patient  Patient understanding: patient states understanding of the procedure being performed  Supporting Documentation  Indications: pain   Procedure Details  Location: knee - R knee  Preparation: Patient was prepped and draped in the usual sterile fashion  Needle size: 22 G  Approach: superior  Medications administered: 30 mg sodium hyaluronate 30 mg/2 mL    Patient tolerance: patient tolerated the procedure well with no immediate complications  Dressing:  Sterile dressing applied

## 2024-04-12 DIAGNOSIS — I10 PRIMARY HYPERTENSION: ICD-10-CM

## 2024-04-12 RX ORDER — VALSARTAN 80 MG/1
80 TABLET ORAL DAILY
Qty: 90 TABLET | Refills: 1 | Status: SHIPPED | OUTPATIENT
Start: 2024-04-12

## 2024-05-01 ENCOUNTER — VBI (OUTPATIENT)
Dept: ADMINISTRATIVE | Facility: OTHER | Age: 59
End: 2024-05-01

## 2024-08-30 ENCOUNTER — OFFICE VISIT (OUTPATIENT)
Dept: FAMILY MEDICINE CLINIC | Facility: CLINIC | Age: 59
End: 2024-08-30
Payer: COMMERCIAL

## 2024-08-30 VITALS
HEIGHT: 72 IN | HEART RATE: 77 BPM | BODY MASS INDEX: 30.69 KG/M2 | WEIGHT: 226.6 LBS | SYSTOLIC BLOOD PRESSURE: 138 MMHG | DIASTOLIC BLOOD PRESSURE: 80 MMHG | OXYGEN SATURATION: 99 % | TEMPERATURE: 97.7 F | RESPIRATION RATE: 16 BRPM

## 2024-08-30 DIAGNOSIS — R73.01 IFG (IMPAIRED FASTING GLUCOSE): ICD-10-CM

## 2024-08-30 DIAGNOSIS — Z12.5 SCREENING FOR PROSTATE CANCER: ICD-10-CM

## 2024-08-30 DIAGNOSIS — I10 PRIMARY HYPERTENSION: Primary | ICD-10-CM

## 2024-08-30 PROCEDURE — 99214 OFFICE O/P EST MOD 30 MIN: CPT | Performed by: FAMILY MEDICINE

## 2024-08-30 RX ORDER — VALSARTAN 80 MG/1
80 TABLET ORAL DAILY
Qty: 90 TABLET | Refills: 1 | Status: SHIPPED | OUTPATIENT
Start: 2024-08-30

## 2024-08-30 NOTE — PROGRESS NOTES
Daquan Dale 1965 male MRN: [id number]    Family Medicine Follow-up Visit    ASSESSMENT/PLAN  Problem List Items Addressed This Visit          Cardiovascular and Mediastinum    Primary hypertension - Primary    Relevant Medications    valsartan (DIOVAN) 80 mg tablet    Other Relevant Orders    Lipid panel    Comprehensive metabolic panel    CBC and differential    TSH, 3rd generation with Free T4 reflex    Hemoglobin A1C     Other Visit Diagnoses       Screening for prostate cancer        Relevant Orders    PSA (Reflex To Free) (Serial)    IFG (impaired fasting glucose)        Relevant Orders    Comprehensive metabolic panel    Hemoglobin A1C            Follow up in 6 months for CP with labs prior            No future appointments.       SUBJECTIVE  CC: Follow-up (6 month visit, claribel Sebastian, review b/p meds)      HPI:  Daquan Dale is a 58 y.o. male who presents for check up      HPI    Review of Systems   Constitutional:  Negative for chills and fever.   HENT:  Negative for congestion, postnasal drip, rhinorrhea and sinus pain.    Eyes:  Negative for photophobia and visual disturbance.   Respiratory:  Negative for cough and shortness of breath.    Cardiovascular:  Negative for chest pain, palpitations and leg swelling.   Gastrointestinal:  Negative for abdominal pain, constipation, diarrhea, nausea and vomiting.   Genitourinary:  Negative for difficulty urinating and dysuria.   Musculoskeletal:  Negative for arthralgias and myalgias.   Skin:  Negative for rash.   Neurological:  Negative for dizziness and syncope.       Historical Information   The patient history was reviewed as follows:    Past Medical History:   Diagnosis Date    Abscess     resolved 1/29/2016    Keratotic lesion     last assessed 5/2/2015    Localized adiposity     last assessed 10/20/2012    Patellofemoral syndrome of right knee     last assessed 4/28/2014    Skin growth     last assessed 4/21/2015    Squamous cell carcinoma of skin      Synovial cyst of popliteal space     last assessed 4/28/2014     Past Surgical History:   Procedure Laterality Date    APPENDECTOMY  02/08/2010    BERNADINE FRANKLIN MD     Family History   Problem Relation Age of Onset    No Known Problems Mother     No Known Problems Father       Social History   Social History     Substance and Sexual Activity   Alcohol Use Not Currently     Social History     Substance and Sexual Activity   Drug Use Never     Social History     Tobacco Use   Smoking Status Never    Passive exposure: Past   Smokeless Tobacco Never       Medications:     Current Outpatient Medications:     multivitamin (THERAGRAN) TABS, Take 1 tablet by mouth daily, Disp: , Rfl:     valsartan (DIOVAN) 80 mg tablet, Take 1 tablet (80 mg total) by mouth daily, Disp: 90 tablet, Rfl: 1  No Known Allergies    OBJECTIVE    Vitals:   Vitals:    08/30/24 1126   BP: 138/80   BP Location: Left arm   Patient Position: Sitting   Cuff Size: Large   Pulse: 77   Resp: 16   Temp: 97.7 °F (36.5 °C)   TempSrc: Tympanic   SpO2: 99%   Weight: 103 kg (226 lb 9.6 oz)   Height: 6' (1.829 m)           Physical Exam  Constitutional:       Appearance: He is well-developed.   HENT:      Head: Normocephalic and atraumatic.      Right Ear: External ear normal.      Left Ear: External ear normal.      Mouth/Throat:      Mouth: Oropharynx is clear and moist.   Eyes:      Extraocular Movements: EOM normal.      Conjunctiva/sclera: Conjunctivae normal.   Cardiovascular:      Rate and Rhythm: Normal rate and regular rhythm.      Heart sounds: Normal heart sounds. No murmur heard.  Pulmonary:      Effort: Pulmonary effort is normal. No respiratory distress.      Breath sounds: Normal breath sounds. No wheezing.   Musculoskeletal:         General: No edema. Normal range of motion.      Cervical back: Normal range of motion and neck supple.   Skin:     General: Skin is warm and dry.   Neurological:      Mental Status: He is alert and oriented to  person, place, and time.   Psychiatric:         Mood and Affect: Mood and affect normal.         Behavior: Behavior normal.            Labs:        Lisa Camacho DO    8/30/2024

## 2025-02-17 ENCOUNTER — RESULTS FOLLOW-UP (OUTPATIENT)
Dept: FAMILY MEDICINE CLINIC | Facility: CLINIC | Age: 60
End: 2025-02-17

## 2025-02-17 LAB
ALBUMIN SERPL-MCNC: 4.4 G/DL (ref 3.8–4.9)
ALP SERPL-CCNC: 71 IU/L (ref 44–121)
ALT SERPL-CCNC: 48 IU/L (ref 0–44)
AST SERPL-CCNC: 37 IU/L (ref 0–40)
BASOPHILS # BLD AUTO: 0.1 X10E3/UL (ref 0–0.2)
BASOPHILS NFR BLD AUTO: 1 %
BILIRUB SERPL-MCNC: 0.3 MG/DL (ref 0–1.2)
BUN SERPL-MCNC: 19 MG/DL (ref 8–27)
BUN/CREAT SERPL: 22 (ref 10–24)
CALCIUM SERPL-MCNC: 9.7 MG/DL (ref 8.6–10.2)
CHLORIDE SERPL-SCNC: 103 MMOL/L (ref 96–106)
CHOLEST SERPL-MCNC: 186 MG/DL (ref 100–199)
CHOLEST/HDLC SERPL: 4.8 RATIO (ref 0–5)
CO2 SERPL-SCNC: 18 MMOL/L (ref 20–29)
CREAT SERPL-MCNC: 0.88 MG/DL (ref 0.76–1.27)
EGFR: 98 ML/MIN/1.73
EOSINOPHIL # BLD AUTO: 0.2 X10E3/UL (ref 0–0.4)
EOSINOPHIL NFR BLD AUTO: 2 %
ERYTHROCYTE [DISTWIDTH] IN BLOOD BY AUTOMATED COUNT: 12.3 % (ref 11.6–15.4)
EST. AVERAGE GLUCOSE BLD GHB EST-MCNC: 128 MG/DL
GLOBULIN SER-MCNC: 3.3 G/DL (ref 1.5–4.5)
GLUCOSE SERPL-MCNC: 108 MG/DL (ref 70–99)
HBA1C MFR BLD: 6.1 % (ref 4.8–5.6)
HCT VFR BLD AUTO: 43.9 % (ref 37.5–51)
HDLC SERPL-MCNC: 39 MG/DL
HGB BLD-MCNC: 15.2 G/DL (ref 13–17.7)
IMM GRANULOCYTES # BLD: 0 X10E3/UL (ref 0–0.1)
IMM GRANULOCYTES NFR BLD: 0 %
LDLC SERPL CALC-MCNC: 112 MG/DL (ref 0–99)
LYMPHOCYTES # BLD AUTO: 3.2 X10E3/UL (ref 0.7–3.1)
LYMPHOCYTES NFR BLD AUTO: 43 %
MCH RBC QN AUTO: 30.8 PG (ref 26.6–33)
MCHC RBC AUTO-ENTMCNC: 34.6 G/DL (ref 31.5–35.7)
MCV RBC AUTO: 89 FL (ref 79–97)
MONOCYTES # BLD AUTO: 0.8 X10E3/UL (ref 0.1–0.9)
MONOCYTES NFR BLD AUTO: 11 %
NEUTROPHILS # BLD AUTO: 3.2 X10E3/UL (ref 1.4–7)
NEUTROPHILS NFR BLD AUTO: 43 %
PLATELET # BLD AUTO: 315 X10E3/UL (ref 150–450)
POTASSIUM SERPL-SCNC: 4.3 MMOL/L (ref 3.5–5.2)
PROT SERPL-MCNC: 7.7 G/DL (ref 6–8.5)
PSA SERPL-MCNC: 0.4 NG/ML (ref 0–4)
RBC # BLD AUTO: 4.93 X10E6/UL (ref 4.14–5.8)
SL AMB REFLEX CRITERIA: NORMAL
SL AMB VLDL CHOLESTEROL CALC: 35 MG/DL (ref 5–40)
SODIUM SERPL-SCNC: 142 MMOL/L (ref 134–144)
TRIGL SERPL-MCNC: 199 MG/DL (ref 0–149)
TSH SERPL DL<=0.005 MIU/L-ACNC: 2.27 UIU/ML (ref 0.45–4.5)
WBC # BLD AUTO: 7.5 X10E3/UL (ref 3.4–10.8)

## 2025-02-18 ENCOUNTER — OFFICE VISIT (OUTPATIENT)
Dept: FAMILY MEDICINE CLINIC | Facility: CLINIC | Age: 60
End: 2025-02-18
Payer: COMMERCIAL

## 2025-02-18 VITALS
TEMPERATURE: 97.8 F | SYSTOLIC BLOOD PRESSURE: 138 MMHG | BODY MASS INDEX: 31.13 KG/M2 | RESPIRATION RATE: 16 BRPM | DIASTOLIC BLOOD PRESSURE: 84 MMHG | HEART RATE: 94 BPM | HEIGHT: 72 IN | WEIGHT: 229.8 LBS | OXYGEN SATURATION: 97 %

## 2025-02-18 DIAGNOSIS — J01.00 ACUTE NON-RECURRENT MAXILLARY SINUSITIS: ICD-10-CM

## 2025-02-18 DIAGNOSIS — I10 PRIMARY HYPERTENSION: ICD-10-CM

## 2025-02-18 DIAGNOSIS — Z00.00 ANNUAL PHYSICAL EXAM: Primary | ICD-10-CM

## 2025-02-18 PROCEDURE — 99396 PREV VISIT EST AGE 40-64: CPT | Performed by: FAMILY MEDICINE

## 2025-02-18 RX ORDER — AZITHROMYCIN 250 MG/1
TABLET, FILM COATED ORAL
Qty: 6 TABLET | Refills: 0 | Status: SHIPPED | OUTPATIENT
Start: 2025-02-18 | End: 2025-02-22

## 2025-02-18 NOTE — PROGRESS NOTES
Adult Annual Physical  Name: Daquan Dale      : 1965      MRN: [id number]  Encounter Provider: Lisa Camacho DO  Encounter Date: 2025   Encounter department: [facility]    Assessment & Plan  Annual physical exam         Primary hypertension         Acute non-recurrent maxillary sinusitis    Orders:    azithromycin (ZITHROMAX) 250 mg tablet; Take 2 tablets today then 1 tablet daily x 4 days    Immunizations and preventive care screenings were discussed with patient today. Appropriate education was printed on patient's after visit summary.    Discussed risks and benefits of prostate cancer screening. We discussed the controversial history of PSA screening for prostate cancer in the [place] as well as the risk of over detection and over treatment of prostate cancer by way of PSA screening.  The patient understands that PSA blood testing is an imperfect way to screen for prostate cancer and that elevated PSA levels in the blood may also be caused by infection, inflammation, prostatic trauma or manipulation, urological procedures, or by benign prostatic enlargement.    The role of the digital rectal examination in prostate cancer screening was also discussed and I discussed with him that there is large interobserver variability in the findings of digital rectal examination.    Counseling:  Alcohol/drug use: discussed moderation in alcohol intake, the recommendations for healthy alcohol use, and avoidance of illicit drug use.  Dental Health: discussed importance of regular tooth brushing, flossing, and dental visits.  Injury prevention: discussed safety/seat belts, safety helmets, smoke detectors, carbon monoxide detectors, and smoking near bedding or upholstery.  Sexual health: discussed sexually transmitted diseases, partner selection, use of condoms, avoidance of unintended pregnancy, and contraceptive alternatives.  Exercise: the importance of regular  exercise/physical activity was discussed. Recommend exercise 3-5 times per week for at least 30 minutes.          History of Present Illness     Adult Annual Physical:  Patient presents for annual physical.     Diet and Physical Activity:  - Diet/Nutrition: well balanced diet.  - Exercise: moderate cardiovascular exercise.    Depression Screening:  - PHQ-2 Score: 0    General Health:    - Dental: regular dental visits.    Review of Systems   Constitutional:  Negative for chills and fever.   HENT:  Negative for congestion, postnasal drip, rhinorrhea and sinus pain.    Eyes:  Negative for photophobia and visual disturbance.   Respiratory:  Positive for cough. Negative for shortness of breath.    Cardiovascular:  Negative for chest pain, palpitations and leg swelling.   Gastrointestinal:  Negative for abdominal pain, constipation, diarrhea, nausea and vomiting.   Genitourinary:  Negative for difficulty urinating and dysuria.   Musculoskeletal:  Negative for arthralgias and myalgias.   Skin:  Negative for rash.   Neurological:  Negative for dizziness and syncope.     Pertinent Medical History         Medical History Reviewed by provider this encounter:  Tobacco  Allergies  Meds  Problems  Med Hx  Surg Hx  Fam Hx     .  Past Medical History   Past Medical History:   Diagnosis Date    Abscess     resolved 1/29/2016    Keratotic lesion     last assessed 5/2/2015    Localized adiposity     last assessed 10/20/2012    Patellofemoral syndrome of right knee     last assessed 4/28/2014    Skin growth     last assessed 4/21/2015    Squamous cell carcinoma of skin     Synovial cyst of popliteal space     last assessed 4/28/2014     Past Surgical History:   Procedure Laterality Date    APPENDECTOMY  02/08/2010    BERNADINE FRANKLIN MD     Family History   Problem Relation Age of Onset    No Known Problems Mother     No Known Problems Father       reports that he has never smoked. He has been exposed to tobacco smoke. He has  never used smokeless tobacco. He reports that he does not currently use alcohol. He reports that he does not use drugs.  Current Outpatient Medications   Medication Instructions    azithromycin (ZITHROMAX) 250 mg tablet Take 2 tablets today then 1 tablet daily x 4 days    multivitamin (THERAGRAN) TABS 1 tablet, Daily    valsartan (DIOVAN) 80 mg, Oral, Daily   No Known Allergies   Current Outpatient Medications on File Prior to Visit   Medication Sig Dispense Refill    multivitamin (THERAGRAN) TABS Take 1 tablet by mouth daily      valsartan (DIOVAN) 80 mg tablet Take 1 tablet (80 mg total) by mouth daily 90 tablet 1     No current facility-administered medications on file prior to visit.      Social History     Tobacco Use    Smoking status: Never     Passive exposure: Past    Smokeless tobacco: Never   Vaping Use    Vaping status: Never Used   Substance and Sexual Activity    Alcohol use: Not Currently    Drug use: Never    Sexual activity: Yes     Partners: Female     Birth control/protection: None       Objective   /84   Pulse 94   Temp 97.8 °F (36.6 °C) (Tympanic)   Resp 16   Ht 6' (1.829 m)   Wt 104 kg (229 lb 12.8 oz)   SpO2 97%   BMI 31.17 kg/m²     Physical Exam  Constitutional:       General: He is not in acute distress.     Appearance: Normal appearance. He is not ill-appearing, toxic-appearing or diaphoretic.   HENT:      Head: Normocephalic and atraumatic.      Right Ear: Tympanic membrane and ear canal normal.      Left Ear: Tympanic membrane and ear canal normal.      Nose: Congestion and rhinorrhea present.      Mouth/Throat:      Mouth: Mucous membranes are moist.      Pharynx: Oropharynx is clear. No oropharyngeal exudate.   Eyes:      Extraocular Movements: Extraocular movements intact.      Conjunctiva/sclera: Conjunctivae normal.      Pupils: Pupils are equal, round, and reactive to light.   Cardiovascular:      Rate and Rhythm: Normal rate and regular rhythm.      Pulses: Normal  pulses.      Heart sounds: No murmur heard.  Pulmonary:      Effort: Pulmonary effort is normal.      Breath sounds: Normal breath sounds. No wheezing, rhonchi or rales.   Abdominal:      General: Bowel sounds are normal. There is no distension.      Palpations: Abdomen is soft.      Tenderness: There is no abdominal tenderness.   Musculoskeletal:         General: No swelling or tenderness. Normal range of motion.      Cervical back: Normal range of motion and neck supple.   Skin:     General: Skin is warm and dry.      Capillary Refill: Capillary refill takes less than 2 seconds.   Neurological:      General: No focal deficit present.      Mental Status: He is alert and oriented to person, place, and time.      Cranial Nerves: No cranial nerve deficit.   Psychiatric:         Mood and Affect: Mood normal.         Behavior: Behavior normal.         Thought Content: Thought content normal.

## 2025-02-18 NOTE — PATIENT INSTRUCTIONS
"Patient Education     Routine physical for adults   The Basics   Written by the doctors and editors at [facility]   What is a physical? -- A physical is a routine visit, or \"check-up,\" with your doctor. You might also hear it called a \"wellness visit\" or \"preventive visit.\"  During each visit, the doctor will:   Ask about your physical and mental health   Ask about your habits, behaviors, and lifestyle   Do an exam   Give you vaccines if needed   Talk to you about any medicines you take   Give advice about your health   Answer your questions  Getting regular check-ups is an important part of taking care of your health. It can help your doctor find and treat any problems you have. But it's also important for preventing health problems.  A routine physical is different from a \"sick visit.\" A sick visit is when you see a doctor because of a health concern or problem. Since physicals are scheduled ahead of time, you can think about what you want to ask the doctor.  How often should I get a physical? -- It depends on your age and health. In general, for people age 21 years and older:   If you are younger than 50 years, you might be able to get a physical every 3 years.   If you are 50 years or older, your doctor might recommend a physical every year.  If you have an ongoing health condition, like diabetes or high blood pressure, your doctor will probably want to see you more often.  What happens during a physical? -- In general, each visit will include:   Physical exam - The doctor or nurse will check your height, weight, heart rate, and blood pressure. They will also look at your eyes and ears. They will ask about how you are feeling and whether you have any symptoms that bother you.   Medicines - It's a good idea to bring a list of all the medicines you take to each doctor visit. Your doctor will talk to you about your medicines and answer any questions. Tell them if you are having any side effects that bother you. You " "should also tell them if you are having trouble paying for any of your medicines.   Habits and behaviors - This includes:   Your diet   Your exercise habits   Whether you smoke, drink alcohol, or use drugs   Whether you are sexually active   Whether you feel safe at home  Your doctor will talk to you about things you can do to improve your health and lower your risk of health problems. They will also offer help and support. For example, if you want to quit smoking, they can give you advice and might prescribe medicines. If you want to improve your diet or get more physical activity, they can help you with this, too.   Lab tests, if needed - The tests you get will depend on your age and situation. For example, your doctor might want to check your:   Cholesterol   Blood sugar   Iron level   Vaccines - The recommended vaccines will depend on your age, health, and what vaccines you already had. Vaccines are very important because they can prevent certain serious or deadly infections.   Discussion of screening - \"Screening\" means checking for diseases or other health problems before they cause symptoms. Your doctor can recommend screening based on your age, risk, and preferences. This might include tests to check for:   Cancer, such as breast, prostate, cervical, ovarian, colorectal, prostate, lung, or skin cancer   Sexually transmitted infections, such as chlamydia and gonorrhea   Mental health conditions like depression and anxiety  Your doctor will talk to you about the different types of screening tests. They can help you decide which screenings to have. They can also explain what the results might mean.   Answering questions - The physical is a good time to ask the doctor or nurse questions about your health. If needed, they can refer you to other doctors or specialists, too.  Adults older than 65 years often need other care, too. As you get older, your doctor will talk to you about:   How to prevent falling at " home   Hearing or vision tests   Memory testing   How to take your medicines safely   Making sure that you have the help and support you need at home  All topics are updated as new evidence becomes available and our peer review process is complete.  This topic retrieved from [employer] on: May 02, 2024.  Topic 001511 Version 1.0  Release: 32.4.3 - C32.122  © 2024 UpToDate, Inc. and/or its affiliates. All rights reserved.  Consumer Information Use and Disclaimer   Disclaimer: This generalized information is a limited summary of diagnosis, treatment, and/or medication information. It is not meant to be comprehensive and should be used as a tool to help the user understand and/or assess potential diagnostic and treatment options. It does NOT include all information about conditions, treatments, medications, side effects, or risks that may apply to a specific patient. It is not intended to be medical advice or a substitute for the medical advice, diagnosis, or treatment of a health care provider based on the health care provider's examination and assessment of a patient's specific and unique circumstances. Patients must speak with a health care provider for complete information about their health, medical questions, and treatment options, including any risks or benefits regarding use of medications. This information does not endorse any treatments or medications as safe, effective, or approved for treating a specific patient. UpToDate, Inc. and its affiliates disclaim any warranty or liability relating to this information or the use thereof.The use of this information is governed by the Terms of Use, available at https://www.woltersDockeruwer.com/en/know/clinical-effectiveness-terms. 2024© UpToDate, Inc. and its affiliates and/or licensors. All rights reserved.  Copyright   © 2024 UpToDate, Inc. and/or its affiliates. All rights reserved.

## 2025-03-10 DIAGNOSIS — I10 PRIMARY HYPERTENSION: ICD-10-CM

## 2025-03-11 RX ORDER — VALSARTAN 80 MG/1
80 TABLET ORAL DAILY
Qty: 90 TABLET | Refills: 1 | Status: SHIPPED | OUTPATIENT
Start: 2025-03-11

## 2025-04-23 ENCOUNTER — VBI (OUTPATIENT)
Dept: ADMINISTRATIVE | Facility: OTHER | Age: 60
End: 2025-04-23

## 2025-04-23 NOTE — TELEPHONE ENCOUNTER
04/23/25 12:56 PM     Chart reviewed for CRC: Colonoscopy, Mammogram, and Pap Smear (HPV) aka Cervical Cancer Screening ; nothing is submitted to the patient's insurance at this time.     Travis Griggs MA   [facility]

## 2025-07-14 ENCOUNTER — TELEPHONE (OUTPATIENT)
Age: 60
End: 2025-07-14

## 2025-07-14 ENCOUNTER — OFFICE VISIT (OUTPATIENT)
Dept: URGENT CARE | Facility: CLINIC | Age: 60
End: 2025-07-14
Payer: COMMERCIAL

## 2025-07-14 ENCOUNTER — APPOINTMENT (OUTPATIENT)
Dept: RADIOLOGY | Facility: CLINIC | Age: 60
End: 2025-07-14
Attending: PHYSICIAN ASSISTANT
Payer: COMMERCIAL

## 2025-07-14 VITALS
RESPIRATION RATE: 18 BRPM | HEIGHT: 72 IN | TEMPERATURE: 97.7 F | SYSTOLIC BLOOD PRESSURE: 138 MMHG | DIASTOLIC BLOOD PRESSURE: 80 MMHG | HEART RATE: 68 BPM | OXYGEN SATURATION: 95 % | BODY MASS INDEX: 31.02 KG/M2 | WEIGHT: 229 LBS

## 2025-07-14 DIAGNOSIS — M25.511 ACUTE PAIN OF RIGHT SHOULDER: ICD-10-CM

## 2025-07-14 DIAGNOSIS — S43.401A SPRAIN OF RIGHT SHOULDER, UNSPECIFIED SHOULDER SPRAIN TYPE, INITIAL ENCOUNTER: Primary | ICD-10-CM

## 2025-07-14 PROCEDURE — 73030 X-RAY EXAM OF SHOULDER: CPT

## 2025-07-14 PROCEDURE — 99213 OFFICE O/P EST LOW 20 MIN: CPT | Performed by: PHYSICIAN ASSISTANT

## 2025-07-14 RX ORDER — IBUPROFEN 600 MG/1
600 TABLET, FILM COATED ORAL EVERY 8 HOURS PRN
Qty: 30 TABLET | Refills: 0 | Status: SHIPPED | OUTPATIENT
Start: 2025-07-14

## 2025-07-14 NOTE — PROGRESS NOTES
[facility]        NAME: Daquan Dale is a 59 y.o. male  : 1965    MRN: [id number]  DATE: 2025  TIME: 11:06 AM    /80 (BP Location: Left arm, Patient Position: Sitting, Cuff Size: Standard)   Pulse 68   Temp 97.7 °F (36.5 °C) (Tympanic)   Resp 18   Ht 6' (1.829 m)   Wt 104 kg (229 lb)   SpO2 95%   BMI 31.06 kg/m²     Assessment and Plan   Sprain of right shoulder, unspecified shoulder sprain type, initial encounter [S43.401A]  1. Sprain of right shoulder, unspecified shoulder sprain type, initial encounter  XR shoulder 2+ vw right    ibuprofen (MOTRIN) 600 mg tablet    Ambulatory referral to Orthopedic Surgery            Patient Instructions       Follow up with PCP in 3-5 days.  Proceed to  ER if symptoms worsen.    Chief Complaint     Chief Complaint   Patient presents with    Shoulder Pain     Pt reports right shoulder pain with onset one week ago. Denies any known injury. Reports progression of more frequent pain with rom. Managing with Tylenol last dose this morning with no relief.          History of Present Illness       Pt with 1 week right shoulder , no known direct injury    Shoulder Pain         Review of Systems   Review of Systems   Constitutional: Negative.    HENT: Negative.     Eyes: Negative.    Respiratory: Negative.     Cardiovascular: Negative.    Gastrointestinal: Negative.    Endocrine: Negative.    Genitourinary: Negative.    Musculoskeletal: Negative.    Skin: Negative.    Allergic/Immunologic: Negative.    Neurological: Negative.    Hematological: Negative.    Psychiatric/Behavioral: Negative.     All other systems reviewed and are negative.        Current Medications     Current Medications[1]    Current Allergies     Allergies as of 2025    (No Known Allergies)            The following portions of the patient's history were reviewed and updated as appropriate: allergies, current medications, past family history, past medical history, past  social history, past surgical history and problem list.     Past Medical History[2]    Past Surgical History[3]    Family History[4]      Medications have been verified.        Objective   /80 (BP Location: Left arm, Patient Position: Sitting, Cuff Size: Standard)   Pulse 68   Temp 97.7 °F (36.5 °C) (Tympanic)   Resp 18   Ht 6' (1.829 m)   Wt 104 kg (229 lb)   SpO2 95%   BMI 31.06 kg/m²        Physical Exam     Physical Exam  Vitals reviewed.   Constitutional:       Appearance: Normal appearance. He is normal weight.   HENT:      Head: Normocephalic and atraumatic.     Cardiovascular:      Rate and Rhythm: Normal rate and regular rhythm.      Pulses: Normal pulses.      Heart sounds: Normal heart sounds.   Pulmonary:      Effort: Pulmonary effort is normal.      Breath sounds: Normal breath sounds.   Abdominal:      Palpations: Abdomen is soft.     Musculoskeletal:         General: Normal range of motion.      Comments: Right shoulder anterior pain  no ac joint pain no humeral head tenderness no trapezius tender a abduction pain at 45 degrees  distal neuro and vascular wnl      Skin:     General: Skin is warm.      Capillary Refill: Capillary refill takes less than 2 seconds.     Neurological:      Mental Status: He is alert and oriented to person, place, and time.                          [1]   Current Outpatient Medications:     ibuprofen (MOTRIN) 600 mg tablet, Take 1 tablet (600 mg total) by mouth every 8 (eight) hours as needed for mild pain, Disp: 30 tablet, Rfl: 0    multivitamin (THERAGRAN) TABS, Take 1 tablet by mouth in the morning., Disp: , Rfl:     valsartan (DIOVAN) 80 mg tablet, TAKE 1 TABLET BY MOUTH DAILY, Disp: 90 tablet, Rfl: 1  [2]   Past Medical History:  Diagnosis Date    Abscess     resolved 1/29/2016    Keratotic lesion     last assessed 5/2/2015    Localized adiposity     last assessed 10/20/2012    Patellofemoral syndrome of right knee     last assessed 4/28/2014    Skin growth      last assessed 4/21/2015    Squamous cell carcinoma of skin     Synovial cyst of popliteal space     last assessed 4/28/2014   [3]   Past Surgical History:  Procedure Laterality Date    APPENDECTOMY  02/08/2010    BERNADINE FRANKLIN MD   [4]   Family History  Problem Relation Name Age of Onset    No Known Problems Mother      No Known Problems Father

## 2025-07-14 NOTE — TELEPHONE ENCOUNTER
Patient called to schedule an appointment for today 7/14/25 with Dr Camacho for shoulder pain. I offered him an appointment with Cristal Judge and he declined and said he would just put up with the pain instead. Patient disconnected the call before letting me warm transfer to the office. [place] advise back to the patient if needed.

## 2025-07-15 ENCOUNTER — OFFICE VISIT (OUTPATIENT)
Dept: OBGYN CLINIC | Facility: CLINIC | Age: 60
End: 2025-07-15
Attending: PHYSICIAN ASSISTANT
Payer: COMMERCIAL

## 2025-07-15 VITALS — WEIGHT: 236 LBS | HEIGHT: 72 IN | BODY MASS INDEX: 31.97 KG/M2

## 2025-07-15 DIAGNOSIS — M75.31 CALCIFIC TENDONITIS OF RIGHT SHOULDER REGION: Primary | ICD-10-CM

## 2025-07-15 DIAGNOSIS — S43.401A SPRAIN OF RIGHT SHOULDER, UNSPECIFIED SHOULDER SPRAIN TYPE, INITIAL ENCOUNTER: ICD-10-CM

## 2025-07-15 PROCEDURE — 20610 DRAIN/INJ JOINT/BURSA W/O US: CPT | Performed by: ORTHOPAEDIC SURGERY

## 2025-07-15 PROCEDURE — 99213 OFFICE O/P EST LOW 20 MIN: CPT | Performed by: ORTHOPAEDIC SURGERY

## 2025-07-15 RX ORDER — BETAMETHASONE SODIUM PHOSPHATE AND BETAMETHASONE ACETATE 3; 3 MG/ML; MG/ML
6 INJECTION, SUSPENSION INTRA-ARTICULAR; INTRALESIONAL; INTRAMUSCULAR; SOFT TISSUE
Status: COMPLETED | OUTPATIENT
Start: 2025-07-15 | End: 2025-07-15

## 2025-07-15 RX ORDER — LIDOCAINE HYDROCHLORIDE 10 MG/ML
4 INJECTION, SOLUTION EPIDURAL; INFILTRATION; INTRACAUDAL; PERINEURAL
Status: COMPLETED | OUTPATIENT
Start: 2025-07-15 | End: 2025-07-15

## 2025-07-15 RX ADMIN — BETAMETHASONE SODIUM PHOSPHATE AND BETAMETHASONE ACETATE 6 MG: 3; 3 INJECTION, SUSPENSION INTRA-ARTICULAR; INTRALESIONAL; INTRAMUSCULAR; SOFT TISSUE at 10:30

## 2025-07-15 RX ADMIN — LIDOCAINE HYDROCHLORIDE 4 ML: 10 INJECTION, SOLUTION EPIDURAL; INFILTRATION; INTRACAUDAL; PERINEURAL at 10:30

## 2025-07-24 ENCOUNTER — TELEPHONE (OUTPATIENT)
Age: 60
End: 2025-07-24

## 2025-07-24 NOTE — TELEPHONE ENCOUNTER
Patient would like to be scheduled sooner:    Reason for needing a sooner appointment: Spot on side has grown and becoming sensitive to touch     Current Appointment date:08/07  PCP ONLY or Okay with seeing another provider Primary Care Provider     Is the patient Currently on the wait list? Yes    Please review with provider if able to accommodate a sooner appointment.

## 2025-07-25 ENCOUNTER — OFFICE VISIT (OUTPATIENT)
Dept: FAMILY MEDICINE CLINIC | Facility: CLINIC | Age: 60
End: 2025-07-25
Payer: COMMERCIAL

## 2025-07-25 ENCOUNTER — TELEPHONE (OUTPATIENT)
Age: 60
End: 2025-07-25

## 2025-07-25 VITALS
SYSTOLIC BLOOD PRESSURE: 160 MMHG | TEMPERATURE: 99.1 F | HEIGHT: 72 IN | BODY MASS INDEX: 32.13 KG/M2 | RESPIRATION RATE: 18 BRPM | HEART RATE: 88 BPM | DIASTOLIC BLOOD PRESSURE: 98 MMHG | OXYGEN SATURATION: 95 % | WEIGHT: 237.2 LBS

## 2025-07-25 DIAGNOSIS — Z85.820 HISTORY OF MELANOMA: ICD-10-CM

## 2025-07-25 DIAGNOSIS — I10 PRIMARY HYPERTENSION: ICD-10-CM

## 2025-07-25 DIAGNOSIS — L98.9 SKIN LESION OF RIGHT LOWER EXTREMITY: Primary | ICD-10-CM

## 2025-07-25 PROCEDURE — 99214 OFFICE O/P EST MOD 30 MIN: CPT | Performed by: STUDENT IN AN ORGANIZED HEALTH CARE EDUCATION/TRAINING PROGRAM

## 2025-07-25 NOTE — PROGRESS NOTES
Name: Daquan Dale      : 1965      MRN: [id number]  Encounter Provider: Gretchen Ramos MD  Encounter Date: 2025   Encounter department: [facility]  :  Assessment & Plan  Skin lesion of right lower extremity   jaci of melanoma w/ RLE posterior irregular darkened skin lesion c/f skin cancer- Dermatology referral placed    Orders:    Ambulatory Referral to Dermatology; Future    History of melanoma  Recommend pt see Derm once per year due to jaci of melanoma   Orders:    Ambulatory Referral to Dermatology; Future    Primary hypertension  BP above goal but pt notes at goal at home (130/80 today)  C/w valsartan 80 mg daily and RTC with log and BP cuff for comparison               History of Present Illness   58 yo M with HTN presenting for enlarging skin lesion. Notes right thigh skin lesion. Notes RLE posterior lesion for at least one week but notes likely present for longer. Has not seen dermatology for years. Denies any constitutional sx         Review of Systems   Constitutional:  Negative for chills, fever and unexpected weight change.   HENT:  Negative for trouble swallowing.    Eyes:  Negative for pain and visual disturbance.   Respiratory:  Negative for cough and shortness of breath.    Cardiovascular:  Negative for chest pain and palpitations.   Gastrointestinal:  Negative for abdominal pain and blood in stool.   Genitourinary:  Negative for difficulty urinating, dysuria and hematuria.   Musculoskeletal:  Negative for arthralgias and back pain.   Skin:  Negative for color change and rash.   Neurological:  Negative for syncope and light-headedness.   All other systems reviewed and are negative.      Objective   /98 (BP Location: Left arm, Patient Position: Sitting, Cuff Size: Large)   Pulse 88   Temp 99.1 °F (37.3 °C) (Tympanic)   Resp 18   Ht 6' (1.829 m)   Wt 108 kg (237 lb 3.2 oz)   SpO2 95%   BMI 32.17 kg/m²      Physical Exam  Vitals and nursing note  reviewed.   Constitutional:       General: He is not in acute distress.     Appearance: Normal appearance. He is well-developed.   HENT:      Head: Normocephalic and atraumatic.     Eyes:      Conjunctiva/sclera: Conjunctivae normal.       Cardiovascular:      Rate and Rhythm: Normal rate and regular rhythm.      Pulses: Normal pulses.      Heart sounds: Normal heart sounds. No murmur heard.  Pulmonary:      Effort: Pulmonary effort is normal. No respiratory distress.      Breath sounds: Normal breath sounds. No wheezing or rales.   Abdominal:      General: There is no distension.      Palpations: Abdomen is soft.      Tenderness: There is no abdominal tenderness.     Musculoskeletal:         General: No swelling. Normal range of motion.      Cervical back: Normal range of motion and neck supple.      Right lower leg: No edema.      Left lower leg: No edema.     Skin:     General: Skin is warm and dry.      Capillary Refill: Capillary refill takes less than 2 seconds.      Findings: Lesion present.      Comments: RLE posterior irregular darkened skin lesion  > 2 cm      Neurological:      Mental Status: He is alert.     Psychiatric:         Mood and Affect: Mood normal.       Administrative Statements   I have spent a total time of 30 minutes in caring for this patient on the day of the visit/encounter including Prognosis, Risks and benefits of tx options, Instructions for management, Patient and family education, Importance of tx compliance, Risk factor reductions, and Impressions.

## 2025-07-25 NOTE — ASSESSMENT & PLAN NOTE
BP above goal but pt notes at goal at home (130/80 today)  C/w valsartan 80 mg daily and RTC with log and BP cuff for comparison

## 2025-07-28 ENCOUNTER — TELEPHONE (OUTPATIENT)
Age: 60
End: 2025-07-28

## 2025-07-29 ENCOUNTER — CONSULT (OUTPATIENT)
Dept: DERMATOLOGY | Facility: CLINIC | Age: 60
End: 2025-07-29
Payer: COMMERCIAL

## 2025-07-29 VITALS — WEIGHT: 237 LBS | BODY MASS INDEX: 32.14 KG/M2

## 2025-07-29 DIAGNOSIS — L98.9 SKIN LESION OF RIGHT LOWER EXTREMITY: ICD-10-CM

## 2025-07-29 DIAGNOSIS — D48.5 NEOPLASM OF UNCERTAIN BEHAVIOR OF SKIN: Primary | ICD-10-CM

## 2025-07-29 DIAGNOSIS — Z85.820 HISTORY OF MELANOMA: ICD-10-CM

## 2025-07-29 PROCEDURE — 11102 TANGNTL BX SKIN SINGLE LES: CPT | Performed by: DERMATOLOGY

## 2025-07-29 PROCEDURE — 99204 OFFICE O/P NEW MOD 45 MIN: CPT | Performed by: DERMATOLOGY

## 2025-07-29 PROCEDURE — 88305 TISSUE EXAM BY PATHOLOGIST: CPT | Performed by: DERMATOLOGY

## 2025-07-29 PROCEDURE — 88342 IMHCHEM/IMCYTCHM 1ST ANTB: CPT | Performed by: DERMATOLOGY

## 2025-08-11 ENCOUNTER — HOSPITAL ENCOUNTER (EMERGENCY)
Facility: HOSPITAL | Age: 60
Discharge: HOME | End: 2025-08-11
Attending: EMERGENCY MEDICINE | Admitting: EMERGENCY MEDICINE
Payer: COMMERCIAL

## 2025-08-12 ENCOUNTER — APPOINTMENT (EMERGENCY)
Dept: CT IMAGING | Facility: HOSPITAL | Age: 60
End: 2025-08-12
Payer: COMMERCIAL

## 2025-08-12 ENCOUNTER — HOSPITAL ENCOUNTER (EMERGENCY)
Facility: HOSPITAL | Age: 60
Discharge: HOME | End: 2025-08-12
Attending: EMERGENCY MEDICINE | Admitting: EMERGENCY MEDICINE
Payer: COMMERCIAL

## 2025-08-12 ENCOUNTER — TELEPHONE (OUTPATIENT)
Age: 60
End: 2025-08-12

## 2025-08-22 ENCOUNTER — TELEPHONE (OUTPATIENT)
Age: 60
End: 2025-08-22